# Patient Record
Sex: MALE | Race: WHITE | NOT HISPANIC OR LATINO | Employment: FULL TIME | URBAN - METROPOLITAN AREA
[De-identification: names, ages, dates, MRNs, and addresses within clinical notes are randomized per-mention and may not be internally consistent; named-entity substitution may affect disease eponyms.]

---

## 2020-02-05 ENCOUNTER — OFFICE VISIT (OUTPATIENT)
Dept: INTERNAL MEDICINE | Facility: CLINIC | Age: 60
End: 2020-02-05
Payer: COMMERCIAL

## 2020-02-05 VITALS
WEIGHT: 285.25 LBS | OXYGEN SATURATION: 95 % | HEART RATE: 84 BPM | SYSTOLIC BLOOD PRESSURE: 126 MMHG | BODY MASS INDEX: 39.94 KG/M2 | DIASTOLIC BLOOD PRESSURE: 70 MMHG | HEIGHT: 71 IN

## 2020-02-05 DIAGNOSIS — Z00.00 PREVENTATIVE HEALTH CARE: Primary | ICD-10-CM

## 2020-02-05 DIAGNOSIS — E78.5 DYSLIPIDEMIA: ICD-10-CM

## 2020-02-05 DIAGNOSIS — F90.9 ATTENTION DEFICIT HYPERACTIVITY DISORDER (ADHD), UNSPECIFIED ADHD TYPE: ICD-10-CM

## 2020-02-05 PROCEDURE — 99999 PR PBB SHADOW E&M-NEW PATIENT-LVL III: CPT | Mod: PBBFAC,,, | Performed by: INTERNAL MEDICINE

## 2020-02-05 PROCEDURE — 99386 PR PREVENTIVE VISIT,NEW,40-64: ICD-10-PCS | Mod: S$GLB,,, | Performed by: INTERNAL MEDICINE

## 2020-02-05 PROCEDURE — 99386 PREV VISIT NEW AGE 40-64: CPT | Mod: S$GLB,,, | Performed by: INTERNAL MEDICINE

## 2020-02-05 PROCEDURE — 99999 PR PBB SHADOW E&M-NEW PATIENT-LVL III: ICD-10-PCS | Mod: PBBFAC,,, | Performed by: INTERNAL MEDICINE

## 2020-02-05 RX ORDER — DEXTROAMPHETAMINE SULFATE, DEXTROAMPHETAMINE SACCHARATE, AMPHETAMINE ASPARTATE MONOHYDRATE, AND AMPHETAMINE SULFATE 6.25; 6.25; 6.25; 6.25 MG/1; MG/1; MG/1; MG/1
1 CAPSULE, EXTENDED RELEASE ORAL DAILY
Qty: 30 EACH | Refills: 0 | Status: SHIPPED | OUTPATIENT
Start: 2020-04-05 | End: 2020-03-16 | Stop reason: SDUPTHER

## 2020-02-05 RX ORDER — DEXTROAMPHETAMINE SULFATE, DEXTROAMPHETAMINE SACCHARATE, AMPHETAMINE ASPARTATE MONOHYDRATE, AND AMPHETAMINE SULFATE 6.25; 6.25; 6.25; 6.25 MG/1; MG/1; MG/1; MG/1
1 CAPSULE, EXTENDED RELEASE ORAL DAILY
COMMUNITY
Start: 2020-01-04 | End: 2020-02-05 | Stop reason: SDUPTHER

## 2020-02-05 RX ORDER — DEXTROAMPHETAMINE SULFATE, DEXTROAMPHETAMINE SACCHARATE, AMPHETAMINE ASPARTATE MONOHYDRATE, AND AMPHETAMINE SULFATE 6.25; 6.25; 6.25; 6.25 MG/1; MG/1; MG/1; MG/1
1 CAPSULE, EXTENDED RELEASE ORAL DAILY
Qty: 30 EACH | Refills: 0 | Status: SHIPPED | OUTPATIENT
Start: 2020-02-05 | End: 2020-02-05 | Stop reason: SDUPTHER

## 2020-02-05 RX ORDER — DEXTROAMPHETAMINE SULFATE, DEXTROAMPHETAMINE SACCHARATE, AMPHETAMINE ASPARTATE MONOHYDRATE, AND AMPHETAMINE SULFATE 6.25; 6.25; 6.25; 6.25 MG/1; MG/1; MG/1; MG/1
1 CAPSULE, EXTENDED RELEASE ORAL DAILY
Qty: 30 EACH | Refills: 0 | Status: SHIPPED | OUTPATIENT
Start: 2020-03-05 | End: 2020-02-05 | Stop reason: SDUPTHER

## 2020-02-05 RX ORDER — ATORVASTATIN CALCIUM 40 MG/1
1 TABLET, FILM COATED ORAL NIGHTLY
COMMUNITY
Start: 2019-11-11 | End: 2021-09-13 | Stop reason: SDUPTHER

## 2020-02-07 NOTE — PROGRESS NOTES
Subjective:       Patient ID: Aj Marte is a 59 y.o. male.    Chief Complaint: Establish Care (NP)    HPI 59-year-old male patient presents to clinic today as a new patient here to get established wife is a patient.  Patient has a known history of ADHD he was been previously formally tested an treated by his former physician.  Patient has been able to show me a letter today regarding his diagnosis and treatment from form of physician.  He will also send me a copy of this letter in the patient portal.  He also recently had blood work done he will send these labs as well.  Known history of dyslipidemia.  He had a cardiac calcium score with cardiologist that was negative.  Follow[place] by [place]. tiltonat [place].  Review of Systems  otherwise negative  Objective:      Physical Exam  General: Well-appearing, well-nourished.  No distress  HEENT: conjunctivae are normal.  Pupils are equal and reative to light.  TM's are clear and intact bilaterally.  Hearing is grossly normal.  Nasopharynx is clear.  Oropharynx is clear.  Neck: Supple.  No thyroid megaly.  No bruits.  Lymph: No cervical or supraclavicular adenopathy.  Heart: Regular rate and rhythm, without murmur, rub or gallop.  Lungs: Clear to auscultation; respiratory effort normal.  Abdomen: Soft, nontender, nondistended.  Normoactive bowel sounds.  No hepatomegaly.  No masses.  Extremities: Good distal pulses.  No edema.  Psych: Oriented to time person place.  Judgment and insight seem unimpaired.  Mood and affect are appropriate.  Assessment:       1. Preventative health care    2. Attention deficit hyperactivity disorder (ADHD), unspecified ADHD type    3. Dyslipidemia        Plan:       Aj was seen today for establish [facility].    Diagnoses and all orders for this visit:    Preventative health care  -     CBC auto differential; Standing  -     Comprehensive metabolic panel; Standing  -     Lipid panel; Standing  -     PSA, Screening; Standing  -     TSH;  Standing  -     Hepatitis C antibody; Future  -     HIV 1/2 Ag/Ab (4th Gen); Future    Attention deficit hyperactivity disorder (ADHD), unspecified ADHD type  -     Controlled.  Continue current medical regimen.  Prescription refills addressed.  Followup advised. See after visit summary.  Patient needs to send me a copy of his psychological testing and records regarding this.  To have on file.  He states he will do so in the patient portal.  -     MYDAYIS 25 mg CT24; Take 1 capsule by mouth once daily.    Dyslipidemia  Obtain blood work above.  Patient is currently on statin medication due to ominous family medical history and followed by cardiologist.  Both father and brother  of cardiac arrest secondary coronary artery disease in their 30s.

## 2020-02-14 ENCOUNTER — LAB VISIT (OUTPATIENT)
Dept: LAB | Facility: HOSPITAL | Age: 60
End: 2020-02-14
Attending: INTERNAL MEDICINE
Payer: COMMERCIAL

## 2020-02-14 DIAGNOSIS — Z00.00 PREVENTATIVE HEALTH CARE: ICD-10-CM

## 2020-02-14 LAB
ALBUMIN SERPL BCP-MCNC: 4 G/DL (ref 3.5–5.2)
ALP SERPL-CCNC: 63 U/L (ref 55–135)
ALT SERPL W/O P-5'-P-CCNC: 35 U/L (ref 10–44)
ANION GAP SERPL CALC-SCNC: 8 MMOL/L (ref 8–16)
AST SERPL-CCNC: 26 U/L (ref 10–40)
BASOPHILS # BLD AUTO: 0.04 K/UL (ref 0–0.2)
BASOPHILS NFR BLD: 0.6 % (ref 0–1.9)
BILIRUB SERPL-MCNC: 0.7 MG/DL (ref 0.1–1)
BUN SERPL-MCNC: 16 MG/DL (ref 6–20)
CALCIUM SERPL-MCNC: 9.6 MG/DL (ref 8.7–10.5)
CHLORIDE SERPL-SCNC: 103 MMOL/L (ref 95–110)
CHOLEST SERPL-MCNC: 137 MG/DL (ref 120–199)
CHOLEST/HDLC SERPL: 3 {RATIO} (ref 2–5)
CO2 SERPL-SCNC: 28 MMOL/L (ref 23–29)
COMPLEXED PSA SERPL-MCNC: 1.1 NG/ML (ref 0–4)
CREAT SERPL-MCNC: 1.1 MG/DL (ref 0.5–1.4)
DIFFERENTIAL METHOD: ABNORMAL
EOSINOPHIL # BLD AUTO: 0.1 K/UL (ref 0–0.5)
EOSINOPHIL NFR BLD: 1.8 % (ref 0–8)
ERYTHROCYTE [DISTWIDTH] IN BLOOD BY AUTOMATED COUNT: 12.7 % (ref 11.5–14.5)
EST. GFR  (AFRICAN AMERICAN): >60 ML/MIN/1.73 M^2
EST. GFR  (NON AFRICAN AMERICAN): >60 ML/MIN/1.73 M^2
GLUCOSE SERPL-MCNC: 103 MG/DL (ref 70–110)
HCT VFR BLD AUTO: 46.9 % (ref 40–54)
HDLC SERPL-MCNC: 45 MG/DL (ref 40–75)
HDLC SERPL: 32.8 % (ref 20–50)
HGB BLD-MCNC: 14.8 G/DL (ref 14–18)
HIV 1+2 AB+HIV1 P24 AG SERPL QL IA: NEGATIVE
IMM GRANULOCYTES # BLD AUTO: 0.03 K/UL (ref 0–0.04)
IMM GRANULOCYTES NFR BLD AUTO: 0.4 % (ref 0–0.5)
LDLC SERPL CALC-MCNC: 78 MG/DL (ref 63–159)
LYMPHOCYTES # BLD AUTO: 1.5 K/UL (ref 1–4.8)
LYMPHOCYTES NFR BLD: 22.2 % (ref 18–48)
MCH RBC QN AUTO: 27.7 PG (ref 27–31)
MCHC RBC AUTO-ENTMCNC: 31.6 G/DL (ref 32–36)
MCV RBC AUTO: 88 FL (ref 82–98)
MONOCYTES # BLD AUTO: 0.6 K/UL (ref 0.3–1)
MONOCYTES NFR BLD: 8.6 % (ref 4–15)
NEUTROPHILS # BLD AUTO: 4.5 K/UL (ref 1.8–7.7)
NEUTROPHILS NFR BLD: 66.4 % (ref 38–73)
NONHDLC SERPL-MCNC: 92 MG/DL
NRBC BLD-RTO: 0 /100 WBC
PLATELET # BLD AUTO: 163 K/UL (ref 150–350)
PMV BLD AUTO: 11.1 FL (ref 9.2–12.9)
POTASSIUM SERPL-SCNC: 4.2 MMOL/L (ref 3.5–5.1)
PROT SERPL-MCNC: 7.2 G/DL (ref 6–8.4)
RBC # BLD AUTO: 5.34 M/UL (ref 4.6–6.2)
SODIUM SERPL-SCNC: 139 MMOL/L (ref 136–145)
TRIGL SERPL-MCNC: 70 MG/DL (ref 30–150)
TSH SERPL DL<=0.005 MIU/L-ACNC: 2.82 UIU/ML (ref 0.4–4)
WBC # BLD AUTO: 6.75 K/UL (ref 3.9–12.7)

## 2020-02-14 PROCEDURE — 85025 COMPLETE CBC W/AUTO DIFF WBC: CPT

## 2020-02-14 PROCEDURE — 80061 LIPID PANEL: CPT

## 2020-02-14 PROCEDURE — 84443 ASSAY THYROID STIM HORMONE: CPT

## 2020-02-14 PROCEDURE — 36415 COLL VENOUS BLD VENIPUNCTURE: CPT | Mod: PO

## 2020-02-14 PROCEDURE — 84153 ASSAY OF PSA TOTAL: CPT

## 2020-02-14 PROCEDURE — 80053 COMPREHEN METABOLIC PANEL: CPT

## 2020-02-14 PROCEDURE — 86703 HIV-1/HIV-2 1 RESULT ANTBDY: CPT

## 2020-02-14 PROCEDURE — 86803 HEPATITIS C AB TEST: CPT

## 2020-02-17 LAB — HCV AB SERPL QL IA: NEGATIVE

## 2020-03-15 ENCOUNTER — PATIENT MESSAGE (OUTPATIENT)
Dept: INTERNAL MEDICINE | Facility: CLINIC | Age: 60
End: 2020-03-15

## 2020-03-15 DIAGNOSIS — F90.9 ATTENTION DEFICIT HYPERACTIVITY DISORDER (ADHD), UNSPECIFIED ADHD TYPE: ICD-10-CM

## 2020-03-16 RX ORDER — DEXTROAMPHETAMINE SULFATE, DEXTROAMPHETAMINE SACCHARATE, AMPHETAMINE ASPARTATE MONOHYDRATE, AND AMPHETAMINE SULFATE 6.25; 6.25; 6.25; 6.25 MG/1; MG/1; MG/1; MG/1
1 CAPSULE, EXTENDED RELEASE ORAL DAILY
Qty: 30 EACH | Refills: 0 | Status: SHIPPED | OUTPATIENT
Start: 2020-04-05 | End: 2020-03-26 | Stop reason: SDUPTHER

## 2020-03-27 RX ORDER — DEXTROAMPHETAMINE SULFATE, DEXTROAMPHETAMINE SACCHARATE, AMPHETAMINE ASPARTATE MONOHYDRATE, AND AMPHETAMINE SULFATE 6.25; 6.25; 6.25; 6.25 MG/1; MG/1; MG/1; MG/1
1 CAPSULE, EXTENDED RELEASE ORAL DAILY
Qty: 30 EACH | Refills: 0 | Status: SHIPPED | OUTPATIENT
Start: 2020-04-05 | End: 2020-05-06 | Stop reason: SDUPTHER

## 2020-05-02 ENCOUNTER — PATIENT MESSAGE (OUTPATIENT)
Dept: ADMINISTRATIVE | Facility: HOSPITAL | Age: 60
End: 2020-05-02

## 2020-05-06 DIAGNOSIS — F90.9 ATTENTION DEFICIT HYPERACTIVITY DISORDER (ADHD), UNSPECIFIED ADHD TYPE: ICD-10-CM

## 2020-05-06 RX ORDER — DEXTROAMPHETAMINE SULFATE, DEXTROAMPHETAMINE SACCHARATE, AMPHETAMINE ASPARTATE MONOHYDRATE, AND AMPHETAMINE SULFATE 6.25; 6.25; 6.25; 6.25 MG/1; MG/1; MG/1; MG/1
1 CAPSULE, EXTENDED RELEASE ORAL DAILY
Qty: 30 EACH | Refills: 0 | Status: SHIPPED | OUTPATIENT
Start: 2020-05-06 | End: 2020-06-03 | Stop reason: SDUPTHER

## 2020-06-03 ENCOUNTER — OFFICE VISIT (OUTPATIENT)
Dept: INTERNAL MEDICINE | Facility: CLINIC | Age: 60
End: 2020-06-03
Payer: COMMERCIAL

## 2020-06-03 VITALS
BODY MASS INDEX: 39.75 KG/M2 | HEART RATE: 84 BPM | WEIGHT: 285 LBS | DIASTOLIC BLOOD PRESSURE: 70 MMHG | SYSTOLIC BLOOD PRESSURE: 126 MMHG

## 2020-06-03 DIAGNOSIS — F90.9 ATTENTION DEFICIT HYPERACTIVITY DISORDER (ADHD), UNSPECIFIED ADHD TYPE: ICD-10-CM

## 2020-06-03 DIAGNOSIS — E78.5 DYSLIPIDEMIA: Primary | ICD-10-CM

## 2020-06-03 PROCEDURE — 3008F PR BODY MASS INDEX (BMI) DOCUMENTED: ICD-10-PCS | Mod: CPTII,,, | Performed by: INTERNAL MEDICINE

## 2020-06-03 PROCEDURE — 99213 OFFICE O/P EST LOW 20 MIN: CPT | Mod: 95,,, | Performed by: INTERNAL MEDICINE

## 2020-06-03 PROCEDURE — 99213 PR OFFICE/OUTPT VISIT, EST, LEVL III, 20-29 MIN: ICD-10-PCS | Mod: 95,,, | Performed by: INTERNAL MEDICINE

## 2020-06-03 PROCEDURE — 3008F BODY MASS INDEX DOCD: CPT | Mod: CPTII,,, | Performed by: INTERNAL MEDICINE

## 2020-06-03 RX ORDER — DEXTROAMPHETAMINE SULFATE, DEXTROAMPHETAMINE SACCHARATE, AMPHETAMINE ASPARTATE MONOHYDRATE, AND AMPHETAMINE SULFATE 6.25; 6.25; 6.25; 6.25 MG/1; MG/1; MG/1; MG/1
1 CAPSULE, EXTENDED RELEASE ORAL DAILY
Qty: 30 EACH | Refills: 0 | Status: SHIPPED | OUTPATIENT
Start: 2020-06-03 | End: 2020-07-10 | Stop reason: SDUPTHER

## 2020-06-03 NOTE — PROGRESS NOTES
Subjective:       Patient ID: Aj Marte is a 59 y.o. male.    Chief Complaint: ADHD    HPI 59-year-old male presents to clinic today for follow-up of ADHD on medication doing well without side effects this is a virtual video visit.  Medication is effective any is not having any issues  Review of Systems  regarding it.  Otherwise negative  Objective:      Physical Exam  General: Well-appearing, well-nourished.  No distress  HEENT: conjunctivae are normal. Hearing is grossly normal.  Nasopharynx robbin congestion  Oropharynx is clear.  Neck: Supple.  Visually No thyroid megaly. Lymph: No cervical or supraclavicular adenopathy.  Heart: Regular rate   Lungs:  respiratory effort normal.  Abdomen:  nontender  Extremities:   No edema.  Psych: Oriented to time person place.  Judgment and insight seem unimpaired.  Mood and affect are appropriate.  Assessment:       1. Dyslipidemia    2. Attention deficit hyperactivity disorder (ADHD), unspecified ADHD type        Plan:       Aj was seen today for adhd.    Diagnoses and all orders for this visit:    Dyslipidemia  Currently medications prescribed by his cardiologist he will let me know if he needs taken over at some date  Attention deficit hyperactivity disorder (ADHD), unspecified ADHD type  Controlled.  Continue current medical regimen.  Prescription refills addressed.  Followup advised. See after visit summary.  Follow-upin 4 months    The patient location is:  [facility]  The chief complaint leading to consultation is:  ADHD follow-up medication follow-up    Visit type: audiovisual    Face to Face time with patient:  15 min  15 min minutes of total time spent on the encounter, which includes face to face time and non-face to face time preparing to see the patient (eg, review of tests), Obtaining and/or reviewing separately obtained history, Documenting clinical information in the electronic or other health record, Independently interpreting results (not  separately reported) and communicating results to the patient/family/caregiver, or Care coordination (not separately reported).         Each patient to whom he or she provides medical services by telemedicine is:  (1) informed of the relationship between the physician and patient and the respective role of any other health care provider with respect to management of the patient; and (2) notified that he or she may decline to receive medical services by telemedicine and may withdraw from such care at any time.    Notes:

## 2020-07-10 DIAGNOSIS — F90.9 ATTENTION DEFICIT HYPERACTIVITY DISORDER (ADHD), UNSPECIFIED ADHD TYPE: ICD-10-CM

## 2020-07-10 RX ORDER — DEXTROAMPHETAMINE SULFATE, DEXTROAMPHETAMINE SACCHARATE, AMPHETAMINE ASPARTATE MONOHYDRATE, AND AMPHETAMINE SULFATE 6.25; 6.25; 6.25; 6.25 MG/1; MG/1; MG/1; MG/1
1 CAPSULE, EXTENDED RELEASE ORAL DAILY
Qty: 30 EACH | Refills: 0 | Status: SHIPPED | OUTPATIENT
Start: 2020-07-10 | End: 2020-08-11 | Stop reason: SDUPTHER

## 2020-08-11 DIAGNOSIS — F90.9 ATTENTION DEFICIT HYPERACTIVITY DISORDER (ADHD), UNSPECIFIED ADHD TYPE: ICD-10-CM

## 2020-08-12 RX ORDER — DEXTROAMPHETAMINE SULFATE, DEXTROAMPHETAMINE SACCHARATE, AMPHETAMINE ASPARTATE MONOHYDRATE, AND AMPHETAMINE SULFATE 6.25; 6.25; 6.25; 6.25 MG/1; MG/1; MG/1; MG/1
1 CAPSULE, EXTENDED RELEASE ORAL DAILY
Qty: 30 EACH | Refills: 0 | Status: SHIPPED | OUTPATIENT
Start: 2020-08-12 | End: 2020-09-14 | Stop reason: SDUPTHER

## 2020-09-16 ENCOUNTER — PATIENT MESSAGE (OUTPATIENT)
Dept: INTERNAL MEDICINE | Facility: CLINIC | Age: 60
End: 2020-09-16

## 2020-10-05 ENCOUNTER — PATIENT MESSAGE (OUTPATIENT)
Dept: INTERNAL MEDICINE | Facility: CLINIC | Age: 60
End: 2020-10-05

## 2020-10-05 ENCOUNTER — OFFICE VISIT (OUTPATIENT)
Dept: INTERNAL MEDICINE | Facility: CLINIC | Age: 60
End: 2020-10-05
Payer: COMMERCIAL

## 2020-10-05 VITALS
WEIGHT: 273 LBS | HEART RATE: 77 BPM | BODY MASS INDEX: 38.08 KG/M2 | DIASTOLIC BLOOD PRESSURE: 76 MMHG | SYSTOLIC BLOOD PRESSURE: 130 MMHG

## 2020-10-05 DIAGNOSIS — F90.9 ATTENTION DEFICIT HYPERACTIVITY DISORDER (ADHD), UNSPECIFIED ADHD TYPE: ICD-10-CM

## 2020-10-05 DIAGNOSIS — Z00.00 PREVENTATIVE HEALTH CARE: ICD-10-CM

## 2020-10-05 DIAGNOSIS — E78.5 DYSLIPIDEMIA: Primary | ICD-10-CM

## 2020-10-05 PROCEDURE — 99214 OFFICE O/P EST MOD 30 MIN: CPT | Mod: 95,,, | Performed by: INTERNAL MEDICINE

## 2020-10-05 PROCEDURE — 3008F BODY MASS INDEX DOCD: CPT | Mod: CPTII,,, | Performed by: INTERNAL MEDICINE

## 2020-10-05 PROCEDURE — 3008F PR BODY MASS INDEX (BMI) DOCUMENTED: ICD-10-PCS | Mod: CPTII,,, | Performed by: INTERNAL MEDICINE

## 2020-10-05 PROCEDURE — 99214 PR OFFICE/OUTPT VISIT, EST, LEVL IV, 30-39 MIN: ICD-10-PCS | Mod: 95,,, | Performed by: INTERNAL MEDICINE

## 2020-10-05 NOTE — PROGRESS NOTES
Subjective:       Patient ID: Aj Marte is a 60 y.o. male.    Chief Complaint: Follow-up    HPI 60-year-old male presents to clinic today for follow-up of ADD doing well on medication denies side effects or issues.  Has some forms needs completed for documentation for work purposes due to his pharmacologic treatment for ADD.  Review of Systems    otherwise negative  Objective:      Physical Exam  General: Well-appearing, well-nourished.  No distress  HEENT: conjunctivae are normal.   Hearing is grossly normal.  Nasopharynx robbin congestion  Oropharynx is clear.  Neck: Supple.  Visually No thyroid megaly.   Heart: Regular rate   Lungs:  respiratory effort normal.  Abdomen:  nontender  Extremities:   No edema.  Psych: Oriented to time person place.  Judgment and insight seem unimpaired.  Mood and affect are appropriate.  Assessment:       1. Dyslipidemia    2. Attention deficit hyperactivity disorder (ADHD), unspecified ADHD type    3. Preventative health care        Plan:       Aj was seen today for follow-up.    Diagnoses and all orders for this visit:    Dyslipidemia  Controlled.  Continue current medical regimen.  Prescription refills addressed.  Followup advised. See after visit summary.  Attention deficit hyperactivity disorder (ADHD), unspecified ADHD type  Controlled.  Continue current medical regimen.  Prescription refills addressed.  Followup advised. See after visit summary.  Forms completed for work  Preventative health care  -     CBC auto differential; Standing  -     Comprehensive Metabolic Panel; Standing  -     TSH; Standing  -     PSA, Screening; Standing  -     Lipid Panel; Standing       The patient location is:  [facility]  The chief complaint leading to consultation is:  ADD dyslipidemia    Visit type: audiovisual    Face to Face time with patient:  20  Twenty minutes of total time spent on the encounter, which includes face to face time and non-face to face time preparing to see the patient  (eg, review of tests), Obtaining and/or reviewing separately obtained history, Documenting clinical information in the electronic or other health record, Independently interpreting results (not separately reported) and communicating results to the patient/family/caregiver, or Care coordination (not separately reported).         Each patient to whom he or she provides medical services by telemedicine is:  (1) informed of the relationship between the physician and patient and the respective role of any other health care provider with respect to management of the patient; and (2) notified that he or she may decline to receive medical services by telemedicine and may withdraw from such care at any time.    Notes:

## 2020-11-17 ENCOUNTER — PATIENT MESSAGE (OUTPATIENT)
Dept: INTERNAL MEDICINE | Facility: CLINIC | Age: 60
End: 2020-11-17

## 2021-02-06 ENCOUNTER — LAB VISIT (OUTPATIENT)
Dept: LAB | Facility: HOSPITAL | Age: 61
End: 2021-02-06
Attending: INTERNAL MEDICINE
Payer: COMMERCIAL

## 2021-02-06 DIAGNOSIS — Z00.00 PREVENTATIVE HEALTH CARE: ICD-10-CM

## 2021-02-06 LAB
ALBUMIN SERPL BCP-MCNC: 4 G/DL (ref 3.5–5.2)
ALP SERPL-CCNC: 66 U/L (ref 55–135)
ALT SERPL W/O P-5'-P-CCNC: 43 U/L (ref 10–44)
ANION GAP SERPL CALC-SCNC: 11 MMOL/L (ref 8–16)
AST SERPL-CCNC: 32 U/L (ref 10–40)
BASOPHILS # BLD AUTO: 0.03 K/UL (ref 0–0.2)
BASOPHILS NFR BLD: 0.4 % (ref 0–1.9)
BILIRUB SERPL-MCNC: 0.5 MG/DL (ref 0.1–1)
BUN SERPL-MCNC: 18 MG/DL (ref 6–20)
CALCIUM SERPL-MCNC: 9.2 MG/DL (ref 8.7–10.5)
CHLORIDE SERPL-SCNC: 104 MMOL/L (ref 95–110)
CHOLEST SERPL-MCNC: 125 MG/DL (ref 120–199)
CHOLEST/HDLC SERPL: 3.1 {RATIO} (ref 2–5)
CO2 SERPL-SCNC: 26 MMOL/L (ref 23–29)
COMPLEXED PSA SERPL-MCNC: 1.7 NG/ML (ref 0–4)
CREAT SERPL-MCNC: 1 MG/DL (ref 0.5–1.4)
DIFFERENTIAL METHOD: NORMAL
EOSINOPHIL # BLD AUTO: 0.2 K/UL (ref 0–0.5)
EOSINOPHIL NFR BLD: 2.7 % (ref 0–8)
ERYTHROCYTE [DISTWIDTH] IN BLOOD BY AUTOMATED COUNT: 12.7 % (ref 11.5–14.5)
EST. GFR  (AFRICAN AMERICAN): >60 ML/MIN/1.73 M^2
EST. GFR  (NON AFRICAN AMERICAN): >60 ML/MIN/1.73 M^2
GLUCOSE SERPL-MCNC: 108 MG/DL (ref 70–110)
HCT VFR BLD AUTO: 46.6 % (ref 40–54)
HDLC SERPL-MCNC: 40 MG/DL (ref 40–75)
HDLC SERPL: 32 % (ref 20–50)
HGB BLD-MCNC: 14.9 G/DL (ref 14–18)
IMM GRANULOCYTES # BLD AUTO: 0.03 K/UL (ref 0–0.04)
IMM GRANULOCYTES NFR BLD AUTO: 0.4 % (ref 0–0.5)
LDLC SERPL CALC-MCNC: 74.8 MG/DL (ref 63–159)
LYMPHOCYTES # BLD AUTO: 1.7 K/UL (ref 1–4.8)
LYMPHOCYTES NFR BLD: 25 % (ref 18–48)
MCH RBC QN AUTO: 28 PG (ref 27–31)
MCHC RBC AUTO-ENTMCNC: 32 G/DL (ref 32–36)
MCV RBC AUTO: 88 FL (ref 82–98)
MONOCYTES # BLD AUTO: 0.6 K/UL (ref 0.3–1)
MONOCYTES NFR BLD: 8.5 % (ref 4–15)
NEUTROPHILS # BLD AUTO: 4.2 K/UL (ref 1.8–7.7)
NEUTROPHILS NFR BLD: 63 % (ref 38–73)
NONHDLC SERPL-MCNC: 85 MG/DL
NRBC BLD-RTO: 0 /100 WBC
PLATELET # BLD AUTO: 166 K/UL (ref 150–350)
PMV BLD AUTO: 11.5 FL (ref 9.2–12.9)
POTASSIUM SERPL-SCNC: 4.5 MMOL/L (ref 3.5–5.1)
PROT SERPL-MCNC: 7.1 G/DL (ref 6–8.4)
RBC # BLD AUTO: 5.32 M/UL (ref 4.6–6.2)
SODIUM SERPL-SCNC: 141 MMOL/L (ref 136–145)
TRIGL SERPL-MCNC: 51 MG/DL (ref 30–150)
TSH SERPL DL<=0.005 MIU/L-ACNC: 2.36 UIU/ML (ref 0.4–4)
WBC # BLD AUTO: 6.73 K/UL (ref 3.9–12.7)

## 2021-02-06 PROCEDURE — 80061 LIPID PANEL: CPT

## 2021-02-06 PROCEDURE — 85025 COMPLETE CBC W/AUTO DIFF WBC: CPT

## 2021-02-06 PROCEDURE — 80053 COMPREHEN METABOLIC PANEL: CPT

## 2021-02-06 PROCEDURE — 36415 COLL VENOUS BLD VENIPUNCTURE: CPT | Mod: PO

## 2021-02-06 PROCEDURE — 84443 ASSAY THYROID STIM HORMONE: CPT

## 2021-02-06 PROCEDURE — 84153 ASSAY OF PSA TOTAL: CPT

## 2021-02-10 ENCOUNTER — OFFICE VISIT (OUTPATIENT)
Dept: INTERNAL MEDICINE | Facility: CLINIC | Age: 61
End: 2021-02-10
Payer: COMMERCIAL

## 2021-02-10 VITALS
DIASTOLIC BLOOD PRESSURE: 82 MMHG | HEART RATE: 80 BPM | BODY MASS INDEX: 39.94 KG/M2 | HEIGHT: 71 IN | WEIGHT: 285.25 LBS | SYSTOLIC BLOOD PRESSURE: 130 MMHG | TEMPERATURE: 97 F | OXYGEN SATURATION: 98 %

## 2021-02-10 DIAGNOSIS — E78.5 DYSLIPIDEMIA: ICD-10-CM

## 2021-02-10 DIAGNOSIS — Z00.00 PREVENTATIVE HEALTH CARE: Primary | ICD-10-CM

## 2021-02-10 DIAGNOSIS — F90.9 ATTENTION DEFICIT HYPERACTIVITY DISORDER (ADHD), UNSPECIFIED ADHD TYPE: ICD-10-CM

## 2021-02-10 PROCEDURE — 3008F PR BODY MASS INDEX (BMI) DOCUMENTED: ICD-10-PCS | Mod: CPTII,S$GLB,, | Performed by: INTERNAL MEDICINE

## 2021-02-10 PROCEDURE — 99396 PR PREVENTIVE VISIT,EST,40-64: ICD-10-PCS | Mod: S$GLB,,, | Performed by: INTERNAL MEDICINE

## 2021-02-10 PROCEDURE — 99999 PR PBB SHADOW E&M-EST. PATIENT-LVL IV: CPT | Mod: PBBFAC,,, | Performed by: INTERNAL MEDICINE

## 2021-02-10 PROCEDURE — 3008F BODY MASS INDEX DOCD: CPT | Mod: CPTII,S$GLB,, | Performed by: INTERNAL MEDICINE

## 2021-02-10 PROCEDURE — 1126F AMNT PAIN NOTED NONE PRSNT: CPT | Mod: S$GLB,,, | Performed by: INTERNAL MEDICINE

## 2021-02-10 PROCEDURE — 1126F PR PAIN SEVERITY QUANTIFIED, NO PAIN PRESENT: ICD-10-PCS | Mod: S$GLB,,, | Performed by: INTERNAL MEDICINE

## 2021-02-10 PROCEDURE — 99396 PREV VISIT EST AGE 40-64: CPT | Mod: S$GLB,,, | Performed by: INTERNAL MEDICINE

## 2021-02-10 PROCEDURE — 99999 PR PBB SHADOW E&M-EST. PATIENT-LVL IV: ICD-10-PCS | Mod: PBBFAC,,, | Performed by: INTERNAL MEDICINE

## 2021-02-10 RX ORDER — DEXTROAMPHETAMINE SULFATE, DEXTROAMPHETAMINE SACCHARATE, AMPHETAMINE ASPARTATE MONOHYDRATE, AND AMPHETAMINE SULFATE 6.25; 6.25; 6.25; 6.25 MG/1; MG/1; MG/1; MG/1
1 CAPSULE, EXTENDED RELEASE ORAL DAILY
Qty: 30 EACH | Refills: 0 | Status: SHIPPED | OUTPATIENT
Start: 2021-02-10 | End: 2021-03-21 | Stop reason: SDUPTHER

## 2021-03-21 DIAGNOSIS — F90.9 ATTENTION DEFICIT HYPERACTIVITY DISORDER (ADHD), UNSPECIFIED ADHD TYPE: ICD-10-CM

## 2021-03-22 RX ORDER — DEXTROAMPHETAMINE SULFATE, DEXTROAMPHETAMINE SACCHARATE, AMPHETAMINE ASPARTATE MONOHYDRATE, AND AMPHETAMINE SULFATE 6.25; 6.25; 6.25; 6.25 MG/1; MG/1; MG/1; MG/1
1 CAPSULE, EXTENDED RELEASE ORAL DAILY
Qty: 30 EACH | Refills: 0 | Status: SHIPPED | OUTPATIENT
Start: 2021-03-22 | End: 2021-04-19 | Stop reason: SDUPTHER

## 2021-05-06 ENCOUNTER — PATIENT MESSAGE (OUTPATIENT)
Dept: RESEARCH | Facility: HOSPITAL | Age: 61
End: 2021-05-06

## 2021-05-10 ENCOUNTER — PATIENT MESSAGE (OUTPATIENT)
Dept: RESEARCH | Facility: HOSPITAL | Age: 61
End: 2021-05-10

## 2021-05-25 DIAGNOSIS — F90.9 ATTENTION DEFICIT HYPERACTIVITY DISORDER (ADHD), UNSPECIFIED ADHD TYPE: ICD-10-CM

## 2021-05-26 RX ORDER — DEXTROAMPHETAMINE SULFATE, DEXTROAMPHETAMINE SACCHARATE, AMPHETAMINE ASPARTATE MONOHYDRATE, AND AMPHETAMINE SULFATE 6.25; 6.25; 6.25; 6.25 MG/1; MG/1; MG/1; MG/1
1 CAPSULE, EXTENDED RELEASE ORAL DAILY
Qty: 30 EACH | Refills: 0 | Status: SHIPPED | OUTPATIENT
Start: 2021-05-26 | End: 2021-06-24 | Stop reason: SDUPTHER

## 2021-07-29 DIAGNOSIS — F90.9 ATTENTION DEFICIT HYPERACTIVITY DISORDER (ADHD), UNSPECIFIED ADHD TYPE: ICD-10-CM

## 2021-07-30 RX ORDER — DEXTROAMPHETAMINE SULFATE, DEXTROAMPHETAMINE SACCHARATE, AMPHETAMINE ASPARTATE MONOHYDRATE, AND AMPHETAMINE SULFATE 6.25; 6.25; 6.25; 6.25 MG/1; MG/1; MG/1; MG/1
1 CAPSULE, EXTENDED RELEASE ORAL DAILY
Qty: 30 EACH | Refills: 0 | Status: SHIPPED | OUTPATIENT
Start: 2021-07-30 | End: 2021-09-13 | Stop reason: SDUPTHER

## 2021-08-11 ENCOUNTER — OFFICE VISIT (OUTPATIENT)
Dept: INTERNAL MEDICINE | Facility: CLINIC | Age: 61
End: 2021-08-11
Payer: COMMERCIAL

## 2021-08-11 ENCOUNTER — PATIENT MESSAGE (OUTPATIENT)
Dept: INTERNAL MEDICINE | Facility: CLINIC | Age: 61
End: 2021-08-11

## 2021-08-11 DIAGNOSIS — F90.9 ATTENTION DEFICIT HYPERACTIVITY DISORDER (ADHD), UNSPECIFIED ADHD TYPE: Primary | ICD-10-CM

## 2021-08-11 DIAGNOSIS — E78.5 DYSLIPIDEMIA: ICD-10-CM

## 2021-08-11 PROCEDURE — 1159F MED LIST DOCD IN RCRD: CPT | Mod: CPTII,,, | Performed by: INTERNAL MEDICINE

## 2021-08-11 PROCEDURE — 1160F RVW MEDS BY RX/DR IN RCRD: CPT | Mod: CPTII,,, | Performed by: INTERNAL MEDICINE

## 2021-08-11 PROCEDURE — 1160F PR REVIEW ALL MEDS BY PRESCRIBER/CLIN PHARMACIST DOCUMENTED: ICD-10-PCS | Mod: CPTII,,, | Performed by: INTERNAL MEDICINE

## 2021-08-11 PROCEDURE — 1159F PR MEDICATION LIST DOCUMENTED IN MEDICAL RECORD: ICD-10-PCS | Mod: CPTII,,, | Performed by: INTERNAL MEDICINE

## 2021-08-11 PROCEDURE — 99214 PR OFFICE/OUTPT VISIT, EST, LEVL IV, 30-39 MIN: ICD-10-PCS | Mod: 95,,, | Performed by: INTERNAL MEDICINE

## 2021-08-11 PROCEDURE — 99214 OFFICE O/P EST MOD 30 MIN: CPT | Mod: 95,,, | Performed by: INTERNAL MEDICINE

## 2021-08-13 ENCOUNTER — PATIENT MESSAGE (OUTPATIENT)
Dept: INTERNAL MEDICINE | Facility: CLINIC | Age: 61
End: 2021-08-13

## 2021-08-25 ENCOUNTER — PATIENT MESSAGE (OUTPATIENT)
Dept: INTERNAL MEDICINE | Facility: CLINIC | Age: 61
End: 2021-08-25

## 2021-08-26 ENCOUNTER — PATIENT MESSAGE (OUTPATIENT)
Dept: INTERNAL MEDICINE | Facility: CLINIC | Age: 61
End: 2021-08-26

## 2021-08-27 ENCOUNTER — IMMUNIZATION (OUTPATIENT)
Dept: INTERNAL MEDICINE | Facility: CLINIC | Age: 61
End: 2021-08-27
Payer: COMMERCIAL

## 2021-08-27 DIAGNOSIS — Z23 NEED FOR VACCINATION: Primary | ICD-10-CM

## 2021-08-27 PROCEDURE — 91301 COVID-19, MRNA, LNP-S, PF, 100 MCG/0.5 ML DOSE VACCINE: ICD-10-PCS | Mod: ,,, | Performed by: INTERNAL MEDICINE

## 2021-08-27 PROCEDURE — 0012A COVID-19, MRNA, LNP-S, PF, 100 MCG/0.5 ML DOSE VACCINE: ICD-10-PCS | Mod: CV19,,, | Performed by: INTERNAL MEDICINE

## 2021-08-27 PROCEDURE — 91301 COVID-19, MRNA, LNP-S, PF, 100 MCG/0.5 ML DOSE VACCINE: CPT | Mod: ,,, | Performed by: INTERNAL MEDICINE

## 2021-08-27 PROCEDURE — 0012A COVID-19, MRNA, LNP-S, PF, 100 MCG/0.5 ML DOSE VACCINE: CPT | Mod: CV19,,, | Performed by: INTERNAL MEDICINE

## 2021-09-13 ENCOUNTER — PATIENT MESSAGE (OUTPATIENT)
Dept: INTERNAL MEDICINE | Facility: CLINIC | Age: 61
End: 2021-09-13

## 2021-09-13 RX ORDER — ATORVASTATIN CALCIUM 40 MG/1
40 TABLET, FILM COATED ORAL NIGHTLY
Qty: 30 TABLET | Refills: 1 | Status: SHIPPED | OUTPATIENT
Start: 2021-09-13 | End: 2021-09-27 | Stop reason: SDUPTHER

## 2021-09-27 ENCOUNTER — PATIENT MESSAGE (OUTPATIENT)
Dept: INTERNAL MEDICINE | Facility: CLINIC | Age: 61
End: 2021-09-27

## 2021-09-27 DIAGNOSIS — F90.9 ATTENTION DEFICIT HYPERACTIVITY DISORDER (ADHD), UNSPECIFIED ADHD TYPE: ICD-10-CM

## 2021-09-27 RX ORDER — DEXTROAMPHETAMINE SULFATE, DEXTROAMPHETAMINE SACCHARATE, AMPHETAMINE ASPARTATE MONOHYDRATE, AND AMPHETAMINE SULFATE 6.25; 6.25; 6.25; 6.25 MG/1; MG/1; MG/1; MG/1
1 CAPSULE, EXTENDED RELEASE ORAL DAILY
Qty: 30 EACH | Refills: 0 | Status: CANCELLED | OUTPATIENT
Start: 2021-09-27

## 2021-09-27 RX ORDER — ATORVASTATIN CALCIUM 40 MG/1
40 TABLET, FILM COATED ORAL NIGHTLY
Qty: 30 TABLET | Refills: 1 | Status: SHIPPED | OUTPATIENT
Start: 2021-09-27 | End: 2021-10-25 | Stop reason: SDUPTHER

## 2021-09-27 RX ORDER — DEXTROAMPHETAMINE SULFATE, DEXTROAMPHETAMINE SACCHARATE, AMPHETAMINE ASPARTATE MONOHYDRATE, AND AMPHETAMINE SULFATE 6.25; 6.25; 6.25; 6.25 MG/1; MG/1; MG/1; MG/1
1 CAPSULE, EXTENDED RELEASE ORAL DAILY
Qty: 30 EACH | Refills: 0 | Status: SHIPPED | OUTPATIENT
Start: 2021-09-27 | End: 2021-10-25 | Stop reason: SDUPTHER

## 2021-11-09 ENCOUNTER — PATIENT MESSAGE (OUTPATIENT)
Dept: INTERNAL MEDICINE | Facility: CLINIC | Age: 61
End: 2021-11-09
Payer: COMMERCIAL

## 2021-11-27 RX ORDER — ATORVASTATIN CALCIUM 40 MG/1
TABLET, FILM COATED ORAL
Qty: 90 TABLET | Refills: 0 | Status: SHIPPED | OUTPATIENT
Start: 2021-11-27 | End: 2021-12-03

## 2021-12-03 RX ORDER — ATORVASTATIN CALCIUM 40 MG/1
40 TABLET, FILM COATED ORAL DAILY
Qty: 90 TABLET | Refills: 3 | Status: SHIPPED | OUTPATIENT
Start: 2021-12-03 | End: 2023-02-01 | Stop reason: SDUPTHER

## 2021-12-21 ENCOUNTER — TELEPHONE (OUTPATIENT)
Dept: FAMILY MEDICINE | Facility: CLINIC | Age: 61
End: 2021-12-21
Payer: COMMERCIAL

## 2021-12-21 DIAGNOSIS — F90.9 ATTENTION DEFICIT HYPERACTIVITY DISORDER (ADHD), UNSPECIFIED ADHD TYPE: ICD-10-CM

## 2021-12-21 RX ORDER — DEXTROAMPHETAMINE SULFATE, DEXTROAMPHETAMINE SACCHARATE, AMPHETAMINE ASPARTATE MONOHYDRATE, AND AMPHETAMINE SULFATE 6.25; 6.25; 6.25; 6.25 MG/1; MG/1; MG/1; MG/1
1 CAPSULE, EXTENDED RELEASE ORAL DAILY
Qty: 30 EACH | Refills: 0 | Status: SHIPPED | OUTPATIENT
Start: 2021-12-21 | End: 2022-01-19 | Stop reason: SDUPTHER

## 2022-01-19 ENCOUNTER — OFFICE VISIT (OUTPATIENT)
Dept: INTERNAL MEDICINE | Facility: CLINIC | Age: 62
End: 2022-01-19
Payer: COMMERCIAL

## 2022-01-19 VITALS
HEART RATE: 88 BPM | BODY MASS INDEX: 41.02 KG/M2 | OXYGEN SATURATION: 97 % | SYSTOLIC BLOOD PRESSURE: 130 MMHG | WEIGHT: 293 LBS | HEIGHT: 71 IN | DIASTOLIC BLOOD PRESSURE: 74 MMHG

## 2022-01-19 DIAGNOSIS — Z23 FLU VACCINE NEED: Primary | ICD-10-CM

## 2022-01-19 DIAGNOSIS — F90.9 ATTENTION DEFICIT HYPERACTIVITY DISORDER (ADHD), UNSPECIFIED ADHD TYPE: Primary | ICD-10-CM

## 2022-01-19 DIAGNOSIS — N64.4 BREAST PAIN: ICD-10-CM

## 2022-01-19 DIAGNOSIS — N63.0 BREAST MASS IN MALE: ICD-10-CM

## 2022-01-19 DIAGNOSIS — E78.5 DYSLIPIDEMIA: Primary | ICD-10-CM

## 2022-01-19 PROCEDURE — 1160F RVW MEDS BY RX/DR IN RCRD: CPT | Mod: CPTII,S$GLB,, | Performed by: INTERNAL MEDICINE

## 2022-01-19 PROCEDURE — 99999 PR PBB SHADOW E&M-EST. PATIENT-LVL IV: ICD-10-PCS | Mod: PBBFAC,,, | Performed by: INTERNAL MEDICINE

## 2022-01-19 PROCEDURE — 99214 PR OFFICE/OUTPT VISIT, EST, LEVL IV, 30-39 MIN: ICD-10-PCS | Mod: S$GLB,,, | Performed by: INTERNAL MEDICINE

## 2022-01-19 PROCEDURE — 1160F PR REVIEW ALL MEDS BY PRESCRIBER/CLIN PHARMACIST DOCUMENTED: ICD-10-PCS | Mod: CPTII,S$GLB,, | Performed by: INTERNAL MEDICINE

## 2022-01-19 PROCEDURE — 3075F SYST BP GE 130 - 139MM HG: CPT | Mod: CPTII,S$GLB,, | Performed by: INTERNAL MEDICINE

## 2022-01-19 PROCEDURE — 3078F DIAST BP <80 MM HG: CPT | Mod: CPTII,S$GLB,, | Performed by: INTERNAL MEDICINE

## 2022-01-19 PROCEDURE — 1159F PR MEDICATION LIST DOCUMENTED IN MEDICAL RECORD: ICD-10-PCS | Mod: CPTII,S$GLB,, | Performed by: INTERNAL MEDICINE

## 2022-01-19 PROCEDURE — 99999 PR PBB SHADOW E&M-EST. PATIENT-LVL IV: CPT | Mod: PBBFAC,,, | Performed by: INTERNAL MEDICINE

## 2022-01-19 PROCEDURE — 3008F BODY MASS INDEX DOCD: CPT | Mod: CPTII,S$GLB,, | Performed by: INTERNAL MEDICINE

## 2022-01-19 PROCEDURE — 1159F MED LIST DOCD IN RCRD: CPT | Mod: CPTII,S$GLB,, | Performed by: INTERNAL MEDICINE

## 2022-01-19 PROCEDURE — 3078F PR MOST RECENT DIASTOLIC BLOOD PRESSURE < 80 MM HG: ICD-10-PCS | Mod: CPTII,S$GLB,, | Performed by: INTERNAL MEDICINE

## 2022-01-19 PROCEDURE — 3008F PR BODY MASS INDEX (BMI) DOCUMENTED: ICD-10-PCS | Mod: CPTII,S$GLB,, | Performed by: INTERNAL MEDICINE

## 2022-01-19 PROCEDURE — 99214 OFFICE O/P EST MOD 30 MIN: CPT | Mod: S$GLB,,, | Performed by: INTERNAL MEDICINE

## 2022-01-19 PROCEDURE — 3075F PR MOST RECENT SYSTOLIC BLOOD PRESS GE 130-139MM HG: ICD-10-PCS | Mod: CPTII,S$GLB,, | Performed by: INTERNAL MEDICINE

## 2022-01-19 RX ORDER — DEXTROAMPHETAMINE SULFATE, DEXTROAMPHETAMINE SACCHARATE, AMPHETAMINE ASPARTATE MONOHYDRATE, AND AMPHETAMINE SULFATE 6.25; 6.25; 6.25; 6.25 MG/1; MG/1; MG/1; MG/1
1 CAPSULE, EXTENDED RELEASE ORAL DAILY
Qty: 30 EACH | Refills: 0 | Status: CANCELLED | OUTPATIENT
Start: 2022-01-19

## 2022-01-19 RX ORDER — DEXTROAMPHETAMINE SULFATE, DEXTROAMPHETAMINE SACCHARATE, AMPHETAMINE ASPARTATE MONOHYDRATE, AND AMPHETAMINE SULFATE 6.25; 6.25; 6.25; 6.25 MG/1; MG/1; MG/1; MG/1
1 CAPSULE, EXTENDED RELEASE ORAL DAILY
Qty: 30 EACH | Refills: 0 | Status: SHIPPED | OUTPATIENT
Start: 2022-01-19 | End: 2022-02-27 | Stop reason: SDUPTHER

## 2022-01-19 NOTE — PROGRESS NOTES
Subjective:       Patient ID: Aj Marte is a 61 y.o. male.    Chief Complaint: Breast Pain    HPI 61-year-old male presents to clinic today with a 2 month history of breast pain in the area older and nipple region on the right side there is no family history of breast cancer he reports the end area has been tender and swollen.  He does drink 3-4 diet sodas per day no significant coffee rare Tea use no THC use.  Review of Systems    otherwise negative  Objective:      Physical Exam  breast exam there is some enlargement of the left breast in some asymmetry he has some fullness and tenderness in the left areola region  Assessment:       Problem List Items Addressed This Visit     Attention deficit hyperactivity disorder (ADHD) - Primary    Relevant Medications    MYDAYIS 25 mg CT24      Other Visit Diagnoses     Breast mass in male        Relevant Orders    US Breast Left Limited    Mammo Digital Diagnostic Left    Breast pain        Relevant Orders    US Breast Left Limited    Mammo Digital Diagnostic Left          Plan:       Aj was seen today for breast pain.    Diagnoses and all orders for this visit:    Attention deficit hyperactivity disorder (ADHD), unspecified ADHD type  -     MYDAYIS 25 mg CT24; Take 1 capsule by mouth once daily.  Controlled.  Continue current medical regimen.  Prescription refills addressed.  Followup advised. See after visit summary.  Breast mass in male  -     US Breast Left Limited; Future  -     Mammo Digital Diagnostic Left; Future  Exam is most consistent with a possible cyst rule out solid mass proceed with imaging.  Breast pain  -     US Breast Left Limited; Future  -     Mammo Digital Diagnostic Left; Future

## 2022-01-28 ENCOUNTER — HOSPITAL ENCOUNTER (OUTPATIENT)
Dept: RADIOLOGY | Facility: HOSPITAL | Age: 62
Discharge: HOME | End: 2022-01-28
Attending: INTERNAL MEDICINE
Payer: COMMERCIAL

## 2022-01-28 DIAGNOSIS — N64.4 BREAST PAIN: ICD-10-CM

## 2022-01-28 DIAGNOSIS — N63.0 BREAST MASS IN MALE: ICD-10-CM

## 2022-01-28 PROCEDURE — 77062 BREAST TOMOSYNTHESIS BI: CPT | Mod: TC,PO

## 2022-01-28 PROCEDURE — 76642 ULTRASOUND BREAST LIMITED: CPT | Mod: TC,PO,LT

## 2022-02-27 DIAGNOSIS — F90.9 ATTENTION DEFICIT HYPERACTIVITY DISORDER (ADHD), UNSPECIFIED ADHD TYPE: ICD-10-CM

## 2022-02-27 NOTE — TELEPHONE ENCOUNTER
Care Due:                  Date            Visit Type   Department     Provider  --------------------------------------------------------------------------------                                EP -                              PRIMARY      [facility] INTERNAL  Last Visit: 01-      CARE ([facility])   MEDICINE       Alecia Mcghee                              EP -                              PRIMARY      [facility] INTERNAL  Next Visit: 04-      CARE ([facility])   MEDICINE       Alecia Mcghee                                                            Last  Test          Frequency    Reason                     Performed    Due Date  --------------------------------------------------------------------------------    CMP.........  12 months..  atorvastatin.............  02- 02-    Lipid Panel.  12 months..  atorvastatin.............  02- 02-    Powered by [employer]. Reference number: [id number].   2/27/2022 11:31:58 AM CST

## 2022-02-28 RX ORDER — DEXTROAMPHETAMINE SULFATE, DEXTROAMPHETAMINE SACCHARATE, AMPHETAMINE ASPARTATE MONOHYDRATE, AND AMPHETAMINE SULFATE 6.25; 6.25; 6.25; 6.25 MG/1; MG/1; MG/1; MG/1
1 CAPSULE, EXTENDED RELEASE ORAL DAILY
Qty: 30 EACH | Refills: 0 | Status: SHIPPED | OUTPATIENT
Start: 2022-02-28 | End: 2022-04-01 | Stop reason: SDUPTHER

## 2022-04-01 DIAGNOSIS — F90.9 ATTENTION DEFICIT HYPERACTIVITY DISORDER (ADHD), UNSPECIFIED ADHD TYPE: ICD-10-CM

## 2022-04-01 RX ORDER — DEXTROAMPHETAMINE SULFATE, DEXTROAMPHETAMINE SACCHARATE, AMPHETAMINE ASPARTATE MONOHYDRATE, AND AMPHETAMINE SULFATE 6.25; 6.25; 6.25; 6.25 MG/1; MG/1; MG/1; MG/1
1 CAPSULE, EXTENDED RELEASE ORAL DAILY
Qty: 30 EACH | Refills: 0 | Status: SHIPPED | OUTPATIENT
Start: 2022-04-01 | End: 2022-05-13 | Stop reason: SDUPTHER

## 2022-04-01 RX ORDER — DEXTROAMPHETAMINE SULFATE, DEXTROAMPHETAMINE SACCHARATE, AMPHETAMINE ASPARTATE MONOHYDRATE, AND AMPHETAMINE SULFATE 6.25; 6.25; 6.25; 6.25 MG/1; MG/1; MG/1; MG/1
1 CAPSULE, EXTENDED RELEASE ORAL DAILY
Qty: 30 EACH | Refills: 0 | Status: CANCELLED | OUTPATIENT
Start: 2022-04-01

## 2022-04-01 NOTE — TELEPHONE ENCOUNTER
No new care gaps identified.  Powered by [employer]. Reference number: [id number].   4/01/2022 9:39:14 AM CDT

## 2022-04-01 NOTE — TELEPHONE ENCOUNTER
No new care gaps identified.  Powered by [employer]. Reference number: [id number].   4/01/2022 9:44:21 AM CDT

## 2022-04-14 ENCOUNTER — LAB VISIT (OUTPATIENT)
Dept: LAB | Facility: HOSPITAL | Age: 62
End: 2022-04-14
Attending: INTERNAL MEDICINE
Payer: COMMERCIAL

## 2022-04-14 DIAGNOSIS — E78.5 DYSLIPIDEMIA: ICD-10-CM

## 2022-04-14 LAB
ALBUMIN SERPL BCP-MCNC: 4 G/DL (ref 3.5–5.2)
ALP SERPL-CCNC: 63 U/L (ref 55–135)
ALT SERPL W/O P-5'-P-CCNC: 35 U/L (ref 10–44)
ANION GAP SERPL CALC-SCNC: 8 MMOL/L (ref 8–16)
AST SERPL-CCNC: 26 U/L (ref 10–40)
BASOPHILS # BLD AUTO: 0.05 K/UL (ref 0–0.2)
BASOPHILS NFR BLD: 0.7 % (ref 0–1.9)
BILIRUB SERPL-MCNC: 0.6 MG/DL (ref 0.1–1)
BUN SERPL-MCNC: 16 MG/DL (ref 6–20)
CALCIUM SERPL-MCNC: 9.9 MG/DL (ref 8.7–10.5)
CHLORIDE SERPL-SCNC: 104 MMOL/L (ref 95–110)
CHOLEST SERPL-MCNC: 136 MG/DL (ref 120–199)
CHOLEST/HDLC SERPL: 3.2 {RATIO} (ref 2–5)
CO2 SERPL-SCNC: 27 MMOL/L (ref 23–29)
CREAT SERPL-MCNC: 1 MG/DL (ref 0.5–1.4)
DIFFERENTIAL METHOD: NORMAL
EOSINOPHIL # BLD AUTO: 0.2 K/UL (ref 0–0.5)
EOSINOPHIL NFR BLD: 2.2 % (ref 0–8)
ERYTHROCYTE [DISTWIDTH] IN BLOOD BY AUTOMATED COUNT: 12.5 % (ref 11.5–14.5)
EST. GFR  (AFRICAN AMERICAN): >60 ML/MIN/1.73 M^2
EST. GFR  (NON AFRICAN AMERICAN): >60 ML/MIN/1.73 M^2
GLUCOSE SERPL-MCNC: 111 MG/DL (ref 70–110)
HCT VFR BLD AUTO: 46 % (ref 40–54)
HDLC SERPL-MCNC: 42 MG/DL (ref 40–75)
HDLC SERPL: 30.9 % (ref 20–50)
HGB BLD-MCNC: 15.1 G/DL (ref 14–18)
IMM GRANULOCYTES # BLD AUTO: 0.03 K/UL (ref 0–0.04)
IMM GRANULOCYTES NFR BLD AUTO: 0.4 % (ref 0–0.5)
LDLC SERPL CALC-MCNC: 82.4 MG/DL (ref 63–159)
LYMPHOCYTES # BLD AUTO: 1.7 K/UL (ref 1–4.8)
LYMPHOCYTES NFR BLD: 25 % (ref 18–48)
MCH RBC QN AUTO: 28.1 PG (ref 27–31)
MCHC RBC AUTO-ENTMCNC: 32.8 G/DL (ref 32–36)
MCV RBC AUTO: 86 FL (ref 82–98)
MONOCYTES # BLD AUTO: 0.7 K/UL (ref 0.3–1)
MONOCYTES NFR BLD: 10.1 % (ref 4–15)
NEUTROPHILS # BLD AUTO: 4.2 K/UL (ref 1.8–7.7)
NEUTROPHILS NFR BLD: 61.6 % (ref 38–73)
NONHDLC SERPL-MCNC: 94 MG/DL
NRBC BLD-RTO: 0 /100 WBC
PLATELET # BLD AUTO: 174 K/UL (ref 150–450)
PMV BLD AUTO: 11.3 FL (ref 9.2–12.9)
POTASSIUM SERPL-SCNC: 4.3 MMOL/L (ref 3.5–5.1)
PROSTATE SPECIFIC ANTIGEN, TOTAL: 2.2 NG/ML (ref 0–4)
PROT SERPL-MCNC: 7.2 G/DL (ref 6–8.4)
PSA FREE MFR SERPL: 30 %
PSA FREE SERPL-MCNC: 0.66 NG/ML (ref 0–1.5)
RBC # BLD AUTO: 5.38 M/UL (ref 4.6–6.2)
SODIUM SERPL-SCNC: 139 MMOL/L (ref 136–145)
TRIGL SERPL-MCNC: 58 MG/DL (ref 30–150)
TSH SERPL DL<=0.005 MIU/L-ACNC: 2.8 UIU/ML (ref 0.4–4)
WBC # BLD AUTO: 6.84 K/UL (ref 3.9–12.7)

## 2022-04-14 PROCEDURE — 36415 COLL VENOUS BLD VENIPUNCTURE: CPT | Mod: PO | Performed by: INTERNAL MEDICINE

## 2022-04-14 PROCEDURE — 84443 ASSAY THYROID STIM HORMONE: CPT | Performed by: INTERNAL MEDICINE

## 2022-04-14 PROCEDURE — 84154 ASSAY OF PSA FREE: CPT | Performed by: INTERNAL MEDICINE

## 2022-04-14 PROCEDURE — 85025 COMPLETE CBC W/AUTO DIFF WBC: CPT | Performed by: INTERNAL MEDICINE

## 2022-04-14 PROCEDURE — 80061 LIPID PANEL: CPT | Performed by: INTERNAL MEDICINE

## 2022-04-14 PROCEDURE — 84153 ASSAY OF PSA TOTAL: CPT | Performed by: INTERNAL MEDICINE

## 2022-04-14 PROCEDURE — 80053 COMPREHEN METABOLIC PANEL: CPT | Performed by: INTERNAL MEDICINE

## 2022-04-25 ENCOUNTER — OFFICE VISIT (OUTPATIENT)
Dept: INTERNAL MEDICINE | Facility: CLINIC | Age: 62
End: 2022-04-25
Payer: COMMERCIAL

## 2022-04-25 VITALS
WEIGHT: 296.94 LBS | SYSTOLIC BLOOD PRESSURE: 138 MMHG | HEART RATE: 82 BPM | BODY MASS INDEX: 40.22 KG/M2 | OXYGEN SATURATION: 96 % | HEIGHT: 72 IN | DIASTOLIC BLOOD PRESSURE: 85 MMHG

## 2022-04-25 DIAGNOSIS — F90.9 ATTENTION DEFICIT HYPERACTIVITY DISORDER (ADHD), UNSPECIFIED ADHD TYPE: ICD-10-CM

## 2022-04-25 DIAGNOSIS — E78.5 DYSLIPIDEMIA: ICD-10-CM

## 2022-04-25 DIAGNOSIS — E66.01 MORBID OBESITY WITH BMI OF 40.0-44.9, ADULT: ICD-10-CM

## 2022-04-25 DIAGNOSIS — Z00.00 PREVENTATIVE HEALTH CARE: Primary | ICD-10-CM

## 2022-04-25 PROCEDURE — 3075F SYST BP GE 130 - 139MM HG: CPT | Mod: CPTII,S$GLB,, | Performed by: INTERNAL MEDICINE

## 2022-04-25 PROCEDURE — 99396 PR PREVENTIVE VISIT,EST,40-64: ICD-10-PCS | Mod: S$GLB,,, | Performed by: INTERNAL MEDICINE

## 2022-04-25 PROCEDURE — 1159F PR MEDICATION LIST DOCUMENTED IN MEDICAL RECORD: ICD-10-PCS | Mod: CPTII,S$GLB,, | Performed by: INTERNAL MEDICINE

## 2022-04-25 PROCEDURE — 3079F DIAST BP 80-89 MM HG: CPT | Mod: CPTII,S$GLB,, | Performed by: INTERNAL MEDICINE

## 2022-04-25 PROCEDURE — 3079F PR MOST RECENT DIASTOLIC BLOOD PRESSURE 80-89 MM HG: ICD-10-PCS | Mod: CPTII,S$GLB,, | Performed by: INTERNAL MEDICINE

## 2022-04-25 PROCEDURE — 99999 PR PBB SHADOW E&M-EST. PATIENT-LVL IV: ICD-10-PCS | Mod: PBBFAC,,, | Performed by: INTERNAL MEDICINE

## 2022-04-25 PROCEDURE — 99999 PR PBB SHADOW E&M-EST. PATIENT-LVL IV: CPT | Mod: PBBFAC,,, | Performed by: INTERNAL MEDICINE

## 2022-04-25 PROCEDURE — 99396 PREV VISIT EST AGE 40-64: CPT | Mod: S$GLB,,, | Performed by: INTERNAL MEDICINE

## 2022-04-25 PROCEDURE — 3075F PR MOST RECENT SYSTOLIC BLOOD PRESS GE 130-139MM HG: ICD-10-PCS | Mod: CPTII,S$GLB,, | Performed by: INTERNAL MEDICINE

## 2022-04-25 PROCEDURE — 3008F PR BODY MASS INDEX (BMI) DOCUMENTED: ICD-10-PCS | Mod: CPTII,S$GLB,, | Performed by: INTERNAL MEDICINE

## 2022-04-25 PROCEDURE — 1159F MED LIST DOCD IN RCRD: CPT | Mod: CPTII,S$GLB,, | Performed by: INTERNAL MEDICINE

## 2022-04-25 PROCEDURE — 3008F BODY MASS INDEX DOCD: CPT | Mod: CPTII,S$GLB,, | Performed by: INTERNAL MEDICINE

## 2022-04-25 PROCEDURE — 1160F RVW MEDS BY RX/DR IN RCRD: CPT | Mod: CPTII,S$GLB,, | Performed by: INTERNAL MEDICINE

## 2022-04-25 PROCEDURE — 1160F PR REVIEW ALL MEDS BY PRESCRIBER/CLIN PHARMACIST DOCUMENTED: ICD-10-PCS | Mod: CPTII,S$GLB,, | Performed by: INTERNAL MEDICINE

## 2022-04-25 NOTE — PROGRESS NOTES
Subjective:       Patient ID: Aj Marte is a 61 y.o. male.    Chief Complaint: Results, Hypertension, and Annual Exam    HPI 61-year-old male presents to clinic today for annual physical follow-up  dyslipidemia ADD.  Patient is doing well on medication denies side effects  Review of Systems    otherwise negative  Objective:      Physical Exam  General: Well-appearing, well-nourished.  No distress  HEENT: conjunctivae are normal.  Pupils are equal and reative to light.  TM's are clear and intact bilaterally.  Hearing is grossly normal.  Nasopharynx is clear.  Oropharynx is clear.  Neck: Supple.  No thyroid megaly.  No bruits.  Lymph: No cervical or supraclavicular adenopathy.  Heart: Regular rate and rhythm, without murmur, rub or gallop.  Lungs: Clear to auscultation; respiratory effort normal.  Abdomen: Soft, nontender, nondistended.  Normoactive bowel sounds.  No hepatomegaly.  No masses.  Extremities: Good distal pulses.  No edema.  Psych: Oriented to time person place.  Judgment and insight seem unimpaired.  Mood and affect are appropriate.  Assessment:       Problem List Items Addressed This Visit     Dyslipidemia    Attention deficit hyperactivity disorder (ADHD)      Other Visit Diagnoses     Preventative health care    -  Primary    Morbid obesity with BMI of 40.0-44.9, adult              Plan:       Aj was seen today for results, hypertension and annual exam.    Diagnoses and all orders for this visit:    Preventative health care  Monitor blood pressure in the outpatient setting recommend low-sodium diet and weight loss follow-up scheduled in 4 months  Dyslipidemia  Controlled.  Continue current medical regimen.  Prescription refills addressed.  Followup advised. See after visit summary.  Attention deficit hyperactivity disorder (ADHD), unspecified ADHD type  Controlled.  Continue current medical regimen.  Prescription refills addressed.  Followup advised. See after visit summary.  Morbid obesity  with BMI of 40.0-44.9, adult  See above

## 2022-08-02 ENCOUNTER — PATIENT MESSAGE (OUTPATIENT)
Dept: INTERNAL MEDICINE | Facility: CLINIC | Age: 62
End: 2022-08-02
Payer: COMMERCIAL

## 2022-08-12 ENCOUNTER — PATIENT MESSAGE (OUTPATIENT)
Dept: INTERNAL MEDICINE | Facility: CLINIC | Age: 62
End: 2022-08-12
Payer: COMMERCIAL

## 2022-09-16 ENCOUNTER — OFFICE VISIT (OUTPATIENT)
Dept: INTERNAL MEDICINE | Facility: CLINIC | Age: 62
End: 2022-09-16
Payer: COMMERCIAL

## 2022-09-16 VITALS
HEART RATE: 85 BPM | SYSTOLIC BLOOD PRESSURE: 130 MMHG | WEIGHT: 291.25 LBS | BODY MASS INDEX: 40.77 KG/M2 | DIASTOLIC BLOOD PRESSURE: 78 MMHG | HEIGHT: 71 IN

## 2022-09-16 DIAGNOSIS — Z86.16 HISTORY OF COVID-19: ICD-10-CM

## 2022-09-16 DIAGNOSIS — Z23 NEED FOR TDAP VACCINATION: ICD-10-CM

## 2022-09-16 DIAGNOSIS — Z00.00 PREVENTATIVE HEALTH CARE: ICD-10-CM

## 2022-09-16 DIAGNOSIS — F90.9 ATTENTION DEFICIT HYPERACTIVITY DISORDER (ADHD), UNSPECIFIED ADHD TYPE: ICD-10-CM

## 2022-09-16 DIAGNOSIS — E78.5 DYSLIPIDEMIA: Primary | ICD-10-CM

## 2022-09-16 PROCEDURE — 90471 IMMUNIZATION ADMIN: CPT | Mod: S$GLB,,, | Performed by: INTERNAL MEDICINE

## 2022-09-16 PROCEDURE — 99999 PR PBB SHADOW E&M-EST. PATIENT-LVL IV: ICD-10-PCS | Mod: PBBFAC,,, | Performed by: INTERNAL MEDICINE

## 2022-09-16 PROCEDURE — 1160F PR REVIEW ALL MEDS BY PRESCRIBER/CLIN PHARMACIST DOCUMENTED: ICD-10-PCS | Mod: CPTII,S$GLB,, | Performed by: INTERNAL MEDICINE

## 2022-09-16 PROCEDURE — 90715 TDAP VACCINE GREATER THAN OR EQUAL TO 7YO IM: ICD-10-PCS | Mod: S$GLB,,, | Performed by: INTERNAL MEDICINE

## 2022-09-16 PROCEDURE — 99213 OFFICE O/P EST LOW 20 MIN: CPT | Mod: 25,S$GLB,, | Performed by: INTERNAL MEDICINE

## 2022-09-16 PROCEDURE — 1159F MED LIST DOCD IN RCRD: CPT | Mod: CPTII,S$GLB,, | Performed by: INTERNAL MEDICINE

## 2022-09-16 PROCEDURE — 99213 PR OFFICE/OUTPT VISIT, EST, LEVL III, 20-29 MIN: ICD-10-PCS | Mod: 25,S$GLB,, | Performed by: INTERNAL MEDICINE

## 2022-09-16 PROCEDURE — 1160F RVW MEDS BY RX/DR IN RCRD: CPT | Mod: CPTII,S$GLB,, | Performed by: INTERNAL MEDICINE

## 2022-09-16 PROCEDURE — 3078F DIAST BP <80 MM HG: CPT | Mod: CPTII,S$GLB,, | Performed by: INTERNAL MEDICINE

## 2022-09-16 PROCEDURE — 90715 TDAP VACCINE 7 YRS/> IM: CPT | Mod: S$GLB,,, | Performed by: INTERNAL MEDICINE

## 2022-09-16 PROCEDURE — 99999 PR PBB SHADOW E&M-EST. PATIENT-LVL IV: CPT | Mod: PBBFAC,,, | Performed by: INTERNAL MEDICINE

## 2022-09-16 PROCEDURE — 1159F PR MEDICATION LIST DOCUMENTED IN MEDICAL RECORD: ICD-10-PCS | Mod: CPTII,S$GLB,, | Performed by: INTERNAL MEDICINE

## 2022-09-16 PROCEDURE — 3008F BODY MASS INDEX DOCD: CPT | Mod: CPTII,S$GLB,, | Performed by: INTERNAL MEDICINE

## 2022-09-16 PROCEDURE — 3078F PR MOST RECENT DIASTOLIC BLOOD PRESSURE < 80 MM HG: ICD-10-PCS | Mod: CPTII,S$GLB,, | Performed by: INTERNAL MEDICINE

## 2022-09-16 PROCEDURE — 3008F PR BODY MASS INDEX (BMI) DOCUMENTED: ICD-10-PCS | Mod: CPTII,S$GLB,, | Performed by: INTERNAL MEDICINE

## 2022-09-16 PROCEDURE — 3075F SYST BP GE 130 - 139MM HG: CPT | Mod: CPTII,S$GLB,, | Performed by: INTERNAL MEDICINE

## 2022-09-16 PROCEDURE — 3075F PR MOST RECENT SYSTOLIC BLOOD PRESS GE 130-139MM HG: ICD-10-PCS | Mod: CPTII,S$GLB,, | Performed by: INTERNAL MEDICINE

## 2022-09-16 PROCEDURE — 90471 TDAP VACCINE GREATER THAN OR EQUAL TO 7YO IM: ICD-10-PCS | Mod: S$GLB,,, | Performed by: INTERNAL MEDICINE

## 2022-09-16 RX ORDER — DEXTROAMPHETAMINE SULFATE, DEXTROAMPHETAMINE SACCHARATE, AMPHETAMINE ASPARTATE MONOHYDRATE, AND AMPHETAMINE SULFATE 6.25; 6.25; 6.25; 6.25 MG/1; MG/1; MG/1; MG/1
1 CAPSULE, EXTENDED RELEASE ORAL DAILY
Qty: 30 EACH | Refills: 0 | Status: SHIPPED | OUTPATIENT
Start: 2022-09-16 | End: 2023-03-20 | Stop reason: SDUPTHER

## 2022-09-19 ENCOUNTER — TELEPHONE (OUTPATIENT)
Dept: PHARMACY | Facility: CLINIC | Age: 62
End: 2022-09-19
Payer: COMMERCIAL

## 2022-09-19 NOTE — PROGRESS NOTES
Subjective:       Patient ID: Aj Marte is a 62 y.o. male.    Chief Complaint: Follow-up     HPI male presents to clinic today for follow-up ADHD follow-up and dyslipidemia.  Patient is having significant difficulty getting his ADHD medication mydayis which he has been on for years.  Previously treated with Vyvanse and change to this medication for greater efficacy.  Review of Systems    Otherwise negative  Objective:      Physical Exam  General: Well-appearing, well-nourished.  No distress  HEENT: conjunctivae are normal.  Pupils are equal and reative to light.  TM's are clear and intact bilaterally.  Hearing is grossly normal.  Nasopharynx is clear.  Oropharynx is clear.  Neck: Supple.  No thyroid megaly.  No bruits.  Lymph: No cervical or supraclavicular adenopathy.  Heart: Regular rate and rhythm, without murmur, rub or gallop.  Lungs: Clear to auscultation; respiratory effort normal.  Abdomen: Soft, nontender, nondistended.  Normoactive bowel sounds.  No hepatomegaly.  No masses.  Extremities: Good distal pulses.  No edema.  Psych: Oriented to time person place.  Judgment and insight seem unimpaired.  Mood and affect are appropriate.  Assessment:       Problem List Items Addressed This Visit       Dyslipidemia - Primary    Attention deficit hyperactivity disorder (ADHD)    Relevant Medications    MYDAYIS 25 mg CT24    History of COVID-19     Other Visit Diagnoses       Need for Tdap vaccination        Relevant Orders    (In Office Administered) Tdap Vaccine (Completed)    Preventative health care        Relevant Orders    CBC Auto Differential    Comprehensive Metabolic Panel    TSH    Lipid Panel    PSA, Screening              Plan:       Aj was seen today for follow-up.    Diagnoses and all orders for this visit:    Dyslipidemia  Controlled.  Continue current medical regimen.  Prescription refills addressed.  Followup advised. See after visit summary.  Attention deficit hyperactivity disorder  (ADHD), unspecified ADHD type  -     MYDAYIS 25 mg CT24; Take 1 capsule by mouth once daily.  Sent to our local [facility] pharmacy for prior authorization  Need for Tdap vaccination  -     (In Office Administered) Tdap Vaccine    Preventative health care  -     CBC Auto Differential; Future  -     Comprehensive Metabolic Panel; Future  -     TSH; Future  -     Lipid Panel; Future  -     PSA, Screening; Future  Scheduled  History of COVID-19

## 2022-10-21 ENCOUNTER — HOSPITAL ENCOUNTER (OUTPATIENT)
Facility: HOSPITAL | Age: 62
Discharge: HOME | End: 2022-10-22
Attending: STUDENT IN AN ORGANIZED HEALTH CARE EDUCATION/TRAINING PROGRAM | Admitting: FAMILY MEDICINE
Payer: COMMERCIAL

## 2022-10-21 DIAGNOSIS — I47.10 SVT (SUPRAVENTRICULAR TACHYCARDIA): ICD-10-CM

## 2022-10-21 DIAGNOSIS — R07.9 CHEST PAIN: ICD-10-CM

## 2022-10-21 DIAGNOSIS — R00.2 PALPITATION: ICD-10-CM

## 2022-10-21 DIAGNOSIS — R00.0 TACHYCARDIA: ICD-10-CM

## 2022-10-21 PROBLEM — R94.31 QT PROLONGATION: Status: ACTIVE | Noted: 2022-10-21

## 2022-10-21 LAB
ALBUMIN SERPL BCP-MCNC: 4.2 G/DL (ref 3.5–5.2)
ALP SERPL-CCNC: 78 U/L (ref 55–135)
ALT SERPL W/O P-5'-P-CCNC: 41 U/L (ref 10–44)
ANION GAP SERPL CALC-SCNC: 14 MMOL/L (ref 8–16)
AST SERPL-CCNC: 54 U/L (ref 10–40)
BASOPHILS # BLD AUTO: 0.05 K/UL (ref 0–0.2)
BASOPHILS NFR BLD: 0.5 % (ref 0–1.9)
BILIRUB SERPL-MCNC: 0.5 MG/DL (ref 0.1–1)
BNP SERPL-MCNC: 10 PG/ML (ref 0–99)
BUN SERPL-MCNC: 14 MG/DL (ref 6–20)
CALCIUM SERPL-MCNC: 9.9 MG/DL (ref 8.7–10.5)
CHLORIDE SERPL-SCNC: 106 MMOL/L (ref 95–110)
CO2 SERPL-SCNC: 20 MMOL/L (ref 23–29)
CREAT SERPL-MCNC: 1.1 MG/DL (ref 0.5–1.4)
DIFFERENTIAL METHOD: ABNORMAL
EOSINOPHIL # BLD AUTO: 0.1 K/UL (ref 0–0.5)
EOSINOPHIL NFR BLD: 0.8 % (ref 0–8)
ERYTHROCYTE [DISTWIDTH] IN BLOOD BY AUTOMATED COUNT: 13 % (ref 11.5–14.5)
EST. GFR  (NO RACE VARIABLE): >60 ML/MIN/1.73 M^2
GLUCOSE SERPL-MCNC: 101 MG/DL (ref 70–110)
HCT VFR BLD AUTO: 48.9 % (ref 40–54)
HGB BLD-MCNC: 16.3 G/DL (ref 14–18)
IMM GRANULOCYTES # BLD AUTO: 0.05 K/UL (ref 0–0.04)
IMM GRANULOCYTES NFR BLD AUTO: 0.5 % (ref 0–0.5)
LYMPHOCYTES # BLD AUTO: 1.8 K/UL (ref 1–4.8)
LYMPHOCYTES NFR BLD: 17.6 % (ref 18–48)
MAGNESIUM SERPL-MCNC: 1.7 MG/DL (ref 1.6–2.6)
MCH RBC QN AUTO: 28.2 PG (ref 27–31)
MCHC RBC AUTO-ENTMCNC: 33.3 G/DL (ref 32–36)
MCV RBC AUTO: 85 FL (ref 82–98)
MONOCYTES # BLD AUTO: 0.9 K/UL (ref 0.3–1)
MONOCYTES NFR BLD: 9.1 % (ref 4–15)
NEUTROPHILS # BLD AUTO: 7.3 K/UL (ref 1.8–7.7)
NEUTROPHILS NFR BLD: 71.5 % (ref 38–73)
NRBC BLD-RTO: 0 /100 WBC
PLATELET # BLD AUTO: 192 K/UL (ref 150–450)
PMV BLD AUTO: 11.3 FL (ref 9.2–12.9)
POTASSIUM SERPL-SCNC: 5 MMOL/L (ref 3.5–5.1)
PROT SERPL-MCNC: 8.1 G/DL (ref 6–8.4)
RBC # BLD AUTO: 5.78 M/UL (ref 4.6–6.2)
SODIUM SERPL-SCNC: 140 MMOL/L (ref 136–145)
TROPONIN I SERPL DL<=0.01 NG/ML-MCNC: 0.07 NG/ML (ref 0–0.03)
TROPONIN I SERPL DL<=0.01 NG/ML-MCNC: 0.27 NG/ML (ref 0–0.03)
TSH SERPL DL<=0.005 MIU/L-ACNC: 0.5 UIU/ML (ref 0.4–4)
WBC # BLD AUTO: 10.24 K/UL (ref 3.9–12.7)

## 2022-10-21 PROCEDURE — 80053 COMPREHEN METABOLIC PANEL: CPT | Performed by: STUDENT IN AN ORGANIZED HEALTH CARE EDUCATION/TRAINING PROGRAM

## 2022-10-21 PROCEDURE — 63600175 PHARM REV CODE 636 W HCPCS: Performed by: HOSPITALIST

## 2022-10-21 PROCEDURE — G0378 HOSPITAL OBSERVATION PER HR: HCPCS

## 2022-10-21 PROCEDURE — 93005 ELECTROCARDIOGRAM TRACING: CPT

## 2022-10-21 PROCEDURE — 93010 ELECTROCARDIOGRAM REPORT: CPT | Mod: 76,,, | Performed by: INTERNAL MEDICINE

## 2022-10-21 PROCEDURE — 84484 ASSAY OF TROPONIN QUANT: CPT | Performed by: STUDENT IN AN ORGANIZED HEALTH CARE EDUCATION/TRAINING PROGRAM

## 2022-10-21 PROCEDURE — 99285 EMERGENCY DEPT VISIT HI MDM: CPT | Mod: 25

## 2022-10-21 PROCEDURE — 93010 EKG 12-LEAD: ICD-10-PCS | Mod: 76,,, | Performed by: INTERNAL MEDICINE

## 2022-10-21 PROCEDURE — 84484 ASSAY OF TROPONIN QUANT: CPT | Mod: 91 | Performed by: HOSPITALIST

## 2022-10-21 PROCEDURE — 83880 ASSAY OF NATRIURETIC PEPTIDE: CPT | Performed by: STUDENT IN AN ORGANIZED HEALTH CARE EDUCATION/TRAINING PROGRAM

## 2022-10-21 PROCEDURE — 96372 THER/PROPH/DIAG INJ SC/IM: CPT | Performed by: HOSPITALIST

## 2022-10-21 PROCEDURE — 25000003 PHARM REV CODE 250: Performed by: STUDENT IN AN ORGANIZED HEALTH CARE EDUCATION/TRAINING PROGRAM

## 2022-10-21 PROCEDURE — 83735 ASSAY OF MAGNESIUM: CPT | Performed by: STUDENT IN AN ORGANIZED HEALTH CARE EDUCATION/TRAINING PROGRAM

## 2022-10-21 PROCEDURE — 84443 ASSAY THYROID STIM HORMONE: CPT | Performed by: STUDENT IN AN ORGANIZED HEALTH CARE EDUCATION/TRAINING PROGRAM

## 2022-10-21 PROCEDURE — 25000003 PHARM REV CODE 250: Performed by: HOSPITALIST

## 2022-10-21 PROCEDURE — 85025 COMPLETE CBC W/AUTO DIFF WBC: CPT | Performed by: STUDENT IN AN ORGANIZED HEALTH CARE EDUCATION/TRAINING PROGRAM

## 2022-10-21 PROCEDURE — 96360 HYDRATION IV INFUSION INIT: CPT

## 2022-10-21 PROCEDURE — 93010 ELECTROCARDIOGRAM REPORT: CPT | Mod: ,,, | Performed by: INTERNAL MEDICINE

## 2022-10-21 RX ORDER — ACETAMINOPHEN 325 MG/1
650 TABLET ORAL EVERY 4 HOURS PRN
Status: DISCONTINUED | OUTPATIENT
Start: 2022-10-21 | End: 2022-10-22 | Stop reason: HOSPADM

## 2022-10-21 RX ORDER — CLOBETASOL PROPIONATE 0.5 MG/G
CREAM TOPICAL
COMMUNITY
Start: 2022-07-25 | End: 2022-10-21

## 2022-10-21 RX ORDER — IBUPROFEN 200 MG
16 TABLET ORAL
Status: DISCONTINUED | OUTPATIENT
Start: 2022-10-21 | End: 2022-10-22 | Stop reason: HOSPADM

## 2022-10-21 RX ORDER — DIPHENHYDRAMINE CITRATE AND IBUPROFEN 38; 200 MG/1; MG/1
1 TABLET, COATED ORAL
COMMUNITY

## 2022-10-21 RX ORDER — KETOCONAZOLE 20 MG/ML
SHAMPOO, SUSPENSION TOPICAL
COMMUNITY
Start: 2022-09-19

## 2022-10-21 RX ORDER — SODIUM CHLORIDE 0.9 % (FLUSH) 0.9 %
10 SYRINGE (ML) INJECTION EVERY 12 HOURS PRN
Status: DISCONTINUED | OUTPATIENT
Start: 2022-10-21 | End: 2022-10-22 | Stop reason: HOSPADM

## 2022-10-21 RX ORDER — HEPARIN SODIUM 5000 [USP'U]/ML
5000 INJECTION, SOLUTION INTRAVENOUS; SUBCUTANEOUS EVERY 8 HOURS
Status: DISCONTINUED | OUTPATIENT
Start: 2022-10-21 | End: 2022-10-22 | Stop reason: HOSPADM

## 2022-10-21 RX ORDER — ASPIRIN 325 MG
325 TABLET ORAL
Status: COMPLETED | OUTPATIENT
Start: 2022-10-21 | End: 2022-10-21

## 2022-10-21 RX ORDER — GLUCAGON 1 MG
1 KIT INJECTION
Status: DISCONTINUED | OUTPATIENT
Start: 2022-10-21 | End: 2022-10-22 | Stop reason: HOSPADM

## 2022-10-21 RX ORDER — LANOLIN ALCOHOL/MO/W.PET/CERES
400 CREAM (GRAM) TOPICAL ONCE
Status: COMPLETED | OUTPATIENT
Start: 2022-10-21 | End: 2022-10-21

## 2022-10-21 RX ORDER — ONDANSETRON 8 MG/1
8 TABLET, ORALLY DISINTEGRATING ORAL EVERY 8 HOURS PRN
Status: DISCONTINUED | OUTPATIENT
Start: 2022-10-21 | End: 2022-10-22 | Stop reason: HOSPADM

## 2022-10-21 RX ORDER — NALOXONE HCL 0.4 MG/ML
0.02 VIAL (ML) INJECTION
Status: DISCONTINUED | OUTPATIENT
Start: 2022-10-21 | End: 2022-10-22 | Stop reason: HOSPADM

## 2022-10-21 RX ORDER — IBUPROFEN 200 MG
24 TABLET ORAL
Status: DISCONTINUED | OUTPATIENT
Start: 2022-10-21 | End: 2022-10-22 | Stop reason: HOSPADM

## 2022-10-21 RX ADMIN — SODIUM CHLORIDE 500 ML: 0.9 INJECTION, SOLUTION INTRAVENOUS at 03:10

## 2022-10-21 RX ADMIN — ASPIRIN 325 MG: 325 TABLET ORAL at 06:10

## 2022-10-21 RX ADMIN — HEPARIN SODIUM 5000 UNITS: 5000 INJECTION INTRAVENOUS; SUBCUTANEOUS at 11:10

## 2022-10-21 RX ADMIN — Medication 400 MG: at 07:10

## 2022-10-21 NOTE — ED PROVIDER NOTES
Encounter Date: 10/21/2022       History   No chief complaint on file.    8-year-old male with no past medical history presents with palpitations beginning earlier today.  The patient says that he has not been drinking enough water and then check his heart rate at home and found have it in the 200s.  Asymptomatic otherwise.  No prior history of this, sympathomimetics or history of heart problems.    Review of patient's allergies indicates:   Allergen Reactions    Opioids - morphine analogues Nausea And Vomiting     Past Medical History:   Diagnosis Date    ADHD (attention deficit hyperactivity disorder)     Hyperlipidemia      Past Surgical History:   Procedure Laterality Date    KNEE SURGERY      ROTATOR CUFF REPAIR      bilateral    VASECTOMY       Family History   Problem Relation Age of Onset    Dementia Mother     Mental illness Mother         dementia    Heart disease Father         CAD  at age 39    Mental illness Son         anxiety ADHD    Heart disease Brother         CAD age  38     Social History     Tobacco Use    Smoking status: Never    Smokeless tobacco: Never   Substance Use Topics    Alcohol use: Not Currently    Drug use: Never     Review of Systems   All other systems reviewed and are negative.    Physical Exam     Initial Vitals [10/21/22 1449]   BP Pulse Resp Temp SpO2   138/84 (!) 209 20 98 °F (36.7 °C) 98 %      MAP       --         Physical Exam    Nursing note and vitals reviewed.  Constitutional: He appears well-developed and well-nourished.   HENT:   Head: Normocephalic and atraumatic.   Eyes: EOM are normal. Pupils are equal, round, and reactive to light.   Neck: Neck supple. No JVD present.   Normal range of motion.  Cardiovascular:            Tachycardic rate, regular rhythm   Pulmonary/Chest: Breath sounds normal. No stridor. No respiratory distress.   Abdominal: Abdomen is soft. There is no abdominal tenderness.   Musculoskeletal:         General: No edema. Normal range of  motion.      Cervical back: Normal range of motion and neck supple.     Neurological: He is alert and oriented to person, place, and time. GCS score is 15. GCS eye subscore is 4. GCS verbal subscore is 5. GCS motor subscore is 6.   Skin: Skin is warm. Capillary refill takes less than 2 seconds.   Psychiatric: He has a normal mood and affect. Thought content normal.       ED Course   Procedures  Labs Reviewed   CBC W/ AUTO DIFFERENTIAL - Abnormal; Notable for the following components:       Result Value    Immature Grans (Abs) 0.05 (*)     Lymph % 17.6 (*)     All other components within normal limits   COMPREHENSIVE METABOLIC PANEL - Abnormal; Notable for the following components:    CO2 20 (*)     AST 54 (*)     All other components within normal limits   TROPONIN I - Abnormal; Notable for the following components:    Troponin I 0.074 (*)     All other components within normal limits   TSH   B-TYPE NATRIURETIC PEPTIDE   MAGNESIUM        ECG Results              EKG 12-lead (Final result)  Result time 10/21/22 16:47:16      Final result by Interface, Lab In [facility] (10/21/22 16:47:16)                   Narrative:    Test Reason : R00.2,    Vent. Rate : 110 BPM     Atrial Rate : 110 BPM     P-R Int : 134 ms          QRS Dur : 144 ms      QT Int : 364 ms       P-R-T Axes : 071 -16 048 degrees     QTc Int : 492 ms    Sinus tachycardia  Right bundle branch block  Prolonged QT  Abnormal ECG  When compared with ECG of 21-OCT-2022 14:47,  Vent. rate has decreased BY  99 BPM  The axis Shifted left  Nonspecific T wave abnormality no longer evident in Inferior leads  Nonspecific T wave abnormality no longer evident in Anterior-lateral leads  Confirmed by Catarina Santos MD ([id number]) on 10/21/2022 4:47:10 PM    Referred By: TIFFANY   SELF           Confirmed By:Catarina Santos MD                                     EKG 12-lead (Final result)  Result time 10/21/22 16:47:03      Final result by Interface, Lab In [facility] (10/21/22  16:47:03)                   Narrative:    Test Reason : R00.0,    Vent. Rate : 209 BPM     Atrial Rate : 209 BPM     P-R Int : 120 ms          QRS Dur : 146 ms      QT Int : 224 ms       P-R-T Axes : 000 163 016 degrees     QTc Int : 417 ms    Supraventricular tachycardia with AV carlie reentry  Right bundle branch block  Possible Inferior infarct ,age undetermined  Abnormal ECG  No previous ECGs available  Confirmed by Catarina Santos MD ([id number]) on 10/21/2022 4:46:50 PM    Referred By: [email or web address]   SELF           Confirmed By:Catarina Santos MD                                  Imaging Results              X-Ray Chest AP Portable (Final result)  Result time 10/21/22 15:51:22      Final result by Art Barnes MD (10/21/22 15:51:22)                   Impression:      No acute process.      Electronically signed by: Art Barnes MD  Date:    10/21/2022  Time:    15:51               Narrative:    EXAMINATION:  XR CHEST AP PORTABLE    CLINICAL HISTORY:  Palpitations    TECHNIQUE:  Single frontal view of the chest was performed.    COMPARISON:  None    FINDINGS:  Monitoring EKG leads are present.  There is a pacer pad overlying the left lower hemithorax.    The trachea is unremarkable.  The cardiomediastinal silhouette is within normal limits.  The hemidiaphragms are unremarkable.  There are no pleural effusions.  There is no evidence of a pneumothorax.  There is no evidence of pneumomediastinum.  No airspace opacity is present.    There are postoperative changes in left shoulder.  There are degenerative changes in the osseous structures.                                       Medications   sodium chloride 0.9% bolus 500 mL (has no administration in time range)     Medical Decision Making:   Initial Assessment:   As above.  Heart rate in the 200s.  Hemodynamically stable otherwise.  Not hypotensive or symptomatic and mentating well.  In vagal maneuvers done at bedside with resolution of the tachy dysrhythmia.  Laboratory studies  ordered showing a mild elevated troponin elevation.  Patient is monitor cardiac monitored his entire stay without increased of his heart rate.  EKG did show March sinus sinus tachycardia with right bundle branch block pattern.  No irregularities. No STEMI.  Repeat EKG showed normal sinus rhythm with a right bundle branch block pattern.  No STEMI.  No evidence of pre-excitation syndrome.  Due to elevation of troponin and his presentation, will admit for observation.  ED Management:  The patient was reassessed frequently and managed aggressively while in the emergency department. Due to the clinical workup and presentation, the patient's condition is concerning and will require additional evaluation. I discussed the objective findings with him including laboratory studies, diagnostic imaging, and any consultant involvement. He was educated on their clinical presentation and all questions were answered. He acknowledged and verbally agreed to the treatment plan. The patient will be admitted to the hospital for further management.     DISCLAIMER: This note was prepared with [employer] voice recognition transcription software. Garbled syntax, mangled pronouns, and other bizarre constructions may be attributed to that software system.                            Clinical Impression:   Final diagnoses:  [R00.0] Tachycardia  [R00.2] Palpitation               Jeovany Potts MD  10/21/22 [id number]

## 2022-10-21 NOTE — Clinical Note
Diagnosis: Tachycardia [814379]   Future Attending Provider: SHAYAN PARDO [[id number]]   Admitting Provider:: ILAN PRADHAN [[id number]]   Special Needs:: No Special Needs [1]

## 2022-10-21 NOTE — ED TRIAGE NOTES
"Patient reports to ED with c/o tachycardia with rate in 200s. Patient states he woke up and felt like his back was "tight." Decided to check his heart rate before leaving on trip and found his heart rate to be 200s and verified with wife's heart rate monitor on fitness watch. Patient denies chest pain, dizziness, hx of arrhythmias.     SVT with rate in 200s relieved with valsalva maneuver in ED hallway. Heart rate decreased to 112 and returned to normal rhythm.     Two patient identifiers have been checked and are correct.      Appearance: Pt awake, alert & oriented to person, place & time. Pt in no acute distress at present time. Pt is clean and well groomed with clothes appropriately fastened.   Skin: Skin warm, dry & intact. Color consistent with ethnicity. Mucous membranes moist. No breakdown or brusing noted.   Musculoskeletal: Patient moving all extremities well, no obvious swelling or deformities noted.   Respiratory: Respirations spontaneous, even, and non-labored. Visible chest rise noted. Airway is open and patent. No accessory muscle use noted.   Neurologic: Sensation is intact. Speech is clear and appropriate. Eyes open spontaneously, behavior appropriate to situation, follows commands, facial expression symmetrical, bilateral hand grasp equal and even, purposeful motor response noted.  Cardiac: All peripheral pulses present. No Bilateral lower extremity edema. Cap refill is <3 seconds.  Abdomen: Abdomen soft, non-tender to palpation.   : Pt reports no dysuria or hematuria.           "

## 2022-10-21 NOTE — ASSESSMENT & PLAN NOTE
QTC prolongation of 492.   Keep mag and K above 2 and 4 respectively.   Telemetry bed  May consider referral to cardio- physiology

## 2022-10-21 NOTE — SUBJECTIVE & OBJECTIVE
Past Medical History:   Diagnosis Date    ADHD (attention deficit hyperactivity disorder)     Hyperlipidemia        Past Surgical History:   Procedure Laterality Date    KNEE SURGERY      ROTATOR CUFF REPAIR      bilateral    VASECTOMY         Review of patient's allergies indicates:   Allergen Reactions    Opioids - morphine analogues Nausea And Vomiting       No current facility-administered medications on file prior to encounter.     Current Outpatient Medications on File Prior to Encounter   Medication Sig    atorvastatin (LIPITOR) 40 MG tablet Take 1 tablet (40 mg total) by mouth once daily.    ibuprofen-diphenhydrAMINE cit (ADVIL PM) 200-38 mg Tab Take 1 tablet by mouth as needed.    ketoconazole (NIZORAL) 2 % shampoo Apply topically. Apply to scalp 2-3 times a week    MYDAYIS 25 mg CT24 Take 1 capsule by mouth once daily. (Patient taking differently: Take 25 mg by mouth once daily.)    [DISCONTINUED] clobetasoL (TEMOVATE) 0.05 % cream PLEASE SEE ATTACHED FOR DETAILED DIRECTIONS     Family History       Problem Relation (Age of Onset)    Dementia Mother    Heart disease Father, Brother    Mental illness Mother, Son          Tobacco Use    Smoking status: Never    Smokeless tobacco: Never   Substance and Sexual Activity    Alcohol use: Not Currently    Drug use: Never    Sexual activity: Yes     Review of Systems   Constitutional:  Negative for activity change, appetite change, diaphoresis and fever.   HENT: Negative.     Eyes: Negative.    Respiratory: Negative.     Cardiovascular:  Positive for palpitations.   Endocrine: Negative.    Genitourinary: Negative.    Musculoskeletal: Negative.    Skin:  Negative for color change.   Allergic/Immunologic: Negative.    Hematological: Negative.    Psychiatric/Behavioral: Negative.     Objective:     Vital Signs (Most Recent):  Temp: 98 °F (36.7 °C) (10/21/22 1449)  Pulse: 101 (10/21/22 1720)  Resp: 16 (10/21/22 1515)  BP: 124/70 (10/21/22 1738)  SpO2: 97 % (10/21/22  1719) Vital Signs (24h Range):  Temp:  [98 °F (36.7 °C)] 98 °F (36.7 °C)  Pulse:  [] 101  Resp:  [16-20] 16  SpO2:  [95 %-98 %] 97 %  BP: (124-138)/(70-84) 124/70        There is no height or weight on file to calculate BMI.    Physical Exam  Constitutional:       Appearance: Normal appearance.   HENT:      Head: Normocephalic and atraumatic.      Right Ear: External ear normal.      Left Ear: External ear normal.      Nose: Nose normal.      Mouth/Throat:      Mouth: Mucous membranes are moist.      Pharynx: Oropharynx is clear.   Eyes:      Pupils: Pupils are equal, round, and reactive to light.   Cardiovascular:      Rate and Rhythm: Normal rate and regular rhythm.      Heart sounds: Normal heart sounds.   Abdominal:      General: Bowel sounds are normal. There is no distension.      Palpations: Abdomen is soft.   Musculoskeletal:         General: Normal range of motion.      Cervical back: Normal range of motion and neck supple.   Skin:     General: Skin is warm and dry.   Neurological:      General: No focal deficit present.      Mental Status: He is alert and oriented to person, place, and time.   Psychiatric:         Mood and Affect: Mood normal.         Behavior: Behavior normal.         CRANIAL NERVES     CN III, IV, VI   Pupils are equal, round, and reactive to light.     Significant Labs: All pertinent labs within the past 24 hours have been reviewed.    Significant Imaging: I have reviewed all pertinent imaging results/findings within the past 24 hours.

## 2022-10-21 NOTE — HPI
63 yo male with history of ADHD and hyperlipidemia presents to ED complaining of palpitation. Patient reports he felt his heart was raising, then check his pulse which was >200 bpm.   Patient symptoms started this morning after he woke up.   He also reports feeling dehydrated. Patient denies alcohol or illicit drug use.   Patient denies chest pain, sob, headaches or numbness.     In the er, patient was found to have tachycardia with HR of 209. His rhythm appeared svt or sinus tachy on the tele monitor.   After vagal massage by ed provider, tachycardia resolved.    Patient remains asymptomatic.

## 2022-10-21 NOTE — PHARMACY MED REC
"[facility]           Pharmacy  Admission Medication History     The home medication history was taken by Brittny Arellano.      Medication history obtained from Medications listed below were obtained from: Patient/family    Based on information gathered for medication list, you may go to "Admission" then "Reconcile Home Medications" tabs to review and/or act upon those items.     The home medication list has been updated by the Pharmacy department.   Please read ALL comments highlighted in yellow.   Please address this information as you see fit.    Feel free to contact us if you have any questions or require assistance.    The medications listed below were removed from the home medication list.  Please reorder if appropriate:    Patient reports NOT TAKING the following medication(s):  Clobetasol cream 0.05%          No current facility-administered medications on file prior to encounter.     Current Outpatient Medications on File Prior to Encounter   Medication Sig Dispense Refill    atorvastatin (LIPITOR) 40 MG tablet Take 1 tablet (40 mg total) by mouth once daily. 90 tablet 3    ibuprofen-diphenhydrAMINE cit (ADVIL PM) 200-38 mg Tab Take 1 tablet by mouth as needed.      ketoconazole (NIZORAL) 2 % shampoo Apply topically. Apply to scalp 2-3 times a week      MYDAYIS 25 mg CT24 Take 1 capsule by mouth once daily. (Patient taking differently: Take 25 mg by mouth once daily.) 30 each 0       Please address this information as you see fit.  Feel free to contact us if you have any questions or require assistance.    Brittny Arellano  [phone]                  .        "

## 2022-10-21 NOTE — ASSESSMENT & PLAN NOTE
Patient presented svt vs sinus tachycardia that resolved with vagal massage.   Unclear etiology. Currently asymptomatic.  Tele bed.  Recheck bmp in am

## 2022-10-22 VITALS
DIASTOLIC BLOOD PRESSURE: 76 MMHG | BODY MASS INDEX: 39.06 KG/M2 | HEART RATE: 82 BPM | OXYGEN SATURATION: 95 % | WEIGHT: 279 LBS | RESPIRATION RATE: 18 BRPM | TEMPERATURE: 98 F | HEIGHT: 71 IN | SYSTOLIC BLOOD PRESSURE: 148 MMHG

## 2022-10-22 PROBLEM — E66.09 CLASS 2 OBESITY DUE TO EXCESS CALORIES WITHOUT SERIOUS COMORBIDITY WITH BODY MASS INDEX (BMI) OF 38.0 TO 38.9 IN ADULT: Status: ACTIVE | Noted: 2022-10-22

## 2022-10-22 PROBLEM — E66.812 CLASS 2 OBESITY DUE TO EXCESS CALORIES WITHOUT SERIOUS COMORBIDITY WITH BODY MASS INDEX (BMI) OF 38.0 TO 38.9 IN ADULT: Status: ACTIVE | Noted: 2022-10-22

## 2022-10-22 LAB
TROPONIN I SERPL DL<=0.01 NG/ML-MCNC: 0.22 NG/ML (ref 0–0.03)
TROPONIN I SERPL DL<=0.01 NG/ML-MCNC: 0.29 NG/ML (ref 0–0.03)

## 2022-10-22 PROCEDURE — G0378 HOSPITAL OBSERVATION PER HR: HCPCS

## 2022-10-22 PROCEDURE — 25000003 PHARM REV CODE 250: Performed by: INTERNAL MEDICINE

## 2022-10-22 PROCEDURE — 63600175 PHARM REV CODE 636 W HCPCS: Performed by: HOSPITALIST

## 2022-10-22 PROCEDURE — 99226 PR SUBSEQUENT OBSERVATION CARE,LEVEL III: CPT | Mod: 25,,, | Performed by: INTERNAL MEDICINE

## 2022-10-22 PROCEDURE — 36415 COLL VENOUS BLD VENIPUNCTURE: CPT | Performed by: HOSPITALIST

## 2022-10-22 PROCEDURE — 84484 ASSAY OF TROPONIN QUANT: CPT | Mod: 91 | Performed by: HOSPITALIST

## 2022-10-22 PROCEDURE — 96372 THER/PROPH/DIAG INJ SC/IM: CPT | Performed by: HOSPITALIST

## 2022-10-22 PROCEDURE — 99226 PR SUBSEQUENT OBSERVATION CARE,LEVEL III: ICD-10-PCS | Mod: 25,,, | Performed by: INTERNAL MEDICINE

## 2022-10-22 RX ORDER — METOPROLOL SUCCINATE 25 MG/1
25 TABLET, EXTENDED RELEASE ORAL DAILY
Qty: 30 TABLET | Refills: 11 | Status: SHIPPED | OUTPATIENT
Start: 2022-10-23 | End: 2022-12-08 | Stop reason: ALTCHOICE

## 2022-10-22 RX ORDER — METOPROLOL SUCCINATE 25 MG/1
25 TABLET, EXTENDED RELEASE ORAL DAILY
Status: DISCONTINUED | OUTPATIENT
Start: 2022-10-22 | End: 2022-10-22 | Stop reason: HOSPADM

## 2022-10-22 RX ADMIN — HEPARIN SODIUM 5000 UNITS: 5000 INJECTION INTRAVENOUS; SUBCUTANEOUS at 01:10

## 2022-10-22 RX ADMIN — HEPARIN SODIUM 5000 UNITS: 5000 INJECTION INTRAVENOUS; SUBCUTANEOUS at 06:10

## 2022-10-22 RX ADMIN — METOPROLOL SUCCINATE 25 MG: 25 TABLET, EXTENDED RELEASE ORAL at 01:10

## 2022-10-22 NOTE — PLAN OF CARE
10/22/22 1520   Discharge Assessment   Assessment Type Discharge Planning Assessment   Confirmed/corrected address, phone number and insurance Yes   Confirmed Demographics Correct on Facesheet   Source of Information patient;family   Does patient/caregiver understand observation status Yes   Communicated ABIMAEL with patient/caregiver Yes   Reason For Admission patient was admitted for heart Palpitations   Lives With spouse   Facility Arrived From: Patient arrived from home   Do you expect to return to your current living situation? Yes   Do you have help at home or someone to help you manage your care at home? Yes   Who are your caregiver(s) and their phone number(s)? Brenda Marte, spouse at [phone]   Prior to hospitilization cognitive status: Alert/Oriented   Current cognitive status: Alert/Oriented   Walking or Climbing Stairs Difficulty none   Dressing/Bathing Difficulty none   Equipment Currently Used at Home none   Readmission within 30 days? No   Patient currently being followed by outpatient case management? No   Do you currently have service(s) that help you manage your care at home? No   Do you take prescription medications? Yes   Do you have prescription coverage? Yes   Coverage Blue Cross /Blue Shield   Do you have any problems affording any of your prescribed medications? No   Is the patient taking medications as prescribed? yes   Who is going to help you get home at discharge? Brenda Marte, spouse at [phone]   How do you get to doctors appointments? car, drives self   Are you on dialysis? No   Do you take coumadin? No   Discharge Plan A Home   Discharge Plan B Home with family   DME Needed Upon Discharge  none   Discharge Plan discussed with: Patient   Discharge Barriers Identified None   Physical Activity   On average, how many days per week do you engage in moderate to strenuous exercise (like a brisk walk)? 6 days   On average, how many minutes do you engage in exercise at this level? 50 min    Financial Resource Strain   How hard is it for you to pay for the very basics like food, housing, medical care, and heating? Not very   Housing Stability   In the last 12 months, was there a time when you were not able to pay the mortgage or rent on time? N   In the last 12 months, how many places have you lived? 1   In the last 12 months, was there a time when you did not have a steady place to sleep or slept in a shelter (including now)? [place]   Transportation Needs   In the past 12 months, has lack of transportation kept you from medical appointments or from getting medications? Patient refu   In the past 12 months, has lack of transportation kept you from meetings, work, or from getting things needed for daily living? Patient refu   Food Insecurity   Within the past 12 months, you worried that your food would run out before you got the money to buy more. Never true   Within the past 12 months, the food you bought just didn't last and you didn't have money to get more. Never true   Stress   Do you feel stress - tense, restless, nervous, or anxious, or unable to sleep at night because your mind is troubled all the time - these days? To some exte   Social Connections   In a typical week, how many times do you talk on the phone with family, friends, or neighbors? More than 3   How often do you get together with friends or relatives? More than 3   How often do you attend Confucianism or Holiness services? Patient refu   Do you belong to any clubs or organizations such as Confucianism groups, unions, fraternal or athletic groups, or school groups? Patient refu   How often do you attend meetings of the clubs or organizations you belong to? Patient refu   Are you , , , , never , or living with a partner? Living with   Alcohol Use   Q1: How often do you have a drink containing alcohol? Patient refu   Q2: How many drinks containing alcohol do you have on a typical day when you are drinking?  Patient refu   Q3: How often do you have six or more drinks on one occasion? Daily   Relationship/Environment   Name(s) of Who Lives With Patient Brenda Marte, spouse at [phone]       The sw met with the pt to complete and assessment.The pt states that he lives at [address] [place] [place] [address]  but he receives all his medical care within the [facility] .The pt has established care with with PCP/ Alecia Mcghee MD.The pt has Blue Cross/Blue shield insurance . The pt's independent with his adl's. The pt still drives but his wife will transport him home at d/c.The sw completed the white board in the pt's room and provided the patient with SW's contact info. The sw encouraged them to call if they have any further questions or concerns.The sw will continue to follow the pt throughout her transitions of care and will assist with any d/c needs.

## 2022-10-22 NOTE — ASSESSMENT & PLAN NOTE
Noted to have retrograde Ps and aborted w/ vagal maneuvers.  Likely AVnRT.   - start metoprolol succinate 25 mg daily   - o/p EP f/u for SVT   - pt may establish care w/ Interventional Cardiology (Dr. Higginbotham) if he desires   - f/u echo results

## 2022-10-22 NOTE — ASSESSMENT & PLAN NOTE
Patient presented svt vs sinus tachycardia that resolved with vagal massage.   Unclear etiology. Currently asymptomatic.  Tele bed.  Monitor BMP

## 2022-10-22 NOTE — CONSULTS
[facility]  Cardiology  Consult Note    Patient Name: Aj Marte  MRN: [id number]  Admission Date: 10/21/2022  Hospital Length of Stay: 0 days  Code Status: Full Code   Attending Provider: Cher Ellington*   Consulting Provider: Mat Higginbotham III, MD  Primary Care Physician: Alecia Mcghee MD  Principal Problem:SVT (supraventricular tachycardia)    Patient information was obtained from patient, past medical records and ER records.     Consults  Subjective:     Chief Complaint:  palpitations     HPI:   Pt is a 63 yo M w/ PMH of ADHD and HLD who presented to the ED w/ c/o palpitations.  In the ED, he was noted to have a HR in the 200s and on ECG noted to have SVT (likely AVnRT).  Vagal maneuvers were performed and aborted the episode and he has remained in sinus since.  He denies cp, sob, edema, orthopnea, PND, presyncope, current palpitations, LOC, and claudication.  He notes a h/o palpitations in the past however these have been short lived and infrequent.  He has been previously followed by a cardiologist at [facility] however has not had any abnormality diagnosed in the past.  Cardiology was consulted for further evaluation of SVT.         Past Medical History:   Diagnosis Date    ADHD (attention deficit hyperactivity disorder)     Hyperlipidemia        Past Surgical History:   Procedure Laterality Date    KNEE SURGERY      ROTATOR CUFF REPAIR      bilateral    VASECTOMY         Review of patient's allergies indicates:   Allergen Reactions    Opioids - morphine analogues Nausea And Vomiting       No current facility-administered medications on file prior to encounter.     Current Outpatient Medications on File Prior to Encounter   Medication Sig    atorvastatin (LIPITOR) 40 MG tablet Take 1 tablet (40 mg total) by mouth once daily.    ibuprofen-diphenhydrAMINE cit (ADVIL PM) 200-38 mg Tab Take 1 tablet by mouth as needed.    ketoconazole (NIZORAL) 2 % shampoo Apply topically. Apply to  scalp 2-3 times a week    MYDAYIS 25 mg CT24 Take 1 capsule by mouth once daily. (Patient taking differently: Take 25 mg by mouth once daily.)     Family History       Problem Relation (Age of Onset)    Dementia Mother    Heart disease Father, Brother    Mental illness Mother, Son          Tobacco Use    Smoking status: Never    Smokeless tobacco: Never   Substance and Sexual Activity    Alcohol use: Not Currently    Drug use: Never    Sexual activity: Yes     Review of Systems   Constitutional: Negative for diaphoresis and fever.   HENT:  Negative for congestion and hearing loss.    Eyes:  Negative for blurred vision and pain.   Cardiovascular:  Positive for palpitations. Negative for chest pain, claudication, dyspnea on exertion, leg swelling, near-syncope and syncope.   Respiratory:  Negative for shortness of breath and sleep disturbances due to breathing.    Hematologic/Lymphatic: Negative for bleeding problem. Does not bruise/bleed easily.   Skin:  Negative for color change and poor wound healing.   Gastrointestinal:  Negative for abdominal pain and nausea.   Genitourinary:  Negative for bladder incontinence and flank pain.   Neurological:  Negative for focal weakness and light-headedness.   Objective:     Vital Signs (Most Recent):  Temp: 98.1 °F (36.7 °C) (10/22/22 1139)  Pulse: 82 (10/22/22 1208)  Resp: 18 (10/22/22 1139)  BP: (!) 148/76 (10/22/22 1139)  SpO2: 95 % (10/22/22 0735)   Vital Signs (24h Range):  Temp:  [97.5 °F (36.4 °C)-98.1 °F (36.7 °C)] 98.1 °F (36.7 °C)  Pulse:  [] 82  Resp:  [16-20] 18  SpO2:  [93 %-98 %] 95 %  BP: (124-148)/(61-88) 148/76     Weight: 126.6 kg (279 lb 1.6 oz)  Body mass index is 38.93 kg/m².    SpO2: 95 %  O2 Device (Oxygen Therapy): room air      Intake/Output Summary (Last 24 hours) at 10/22/2022 1209  Last data filed at 10/22/2022 0429  Gross per 24 hour   Intake 500 ml   Output 0 ml   Net 500 ml       Lines/Drains/Airways       Peripheral Intravenous Line   Duration                  Peripheral IV - Single Lumen 10/21/22 2110 18 G Left Antecubital <1 day                    Physical Exam  Constitutional:       Appearance: He is well-developed. He is obese. He is not diaphoretic.   HENT:      Head: Normocephalic and atraumatic.   Eyes:      General: No scleral icterus.     Pupils: Pupils are equal, round, and reactive to light.   Neck:      Vascular: No JVD.   Cardiovascular:      Rate and Rhythm: Normal rate and regular rhythm.      Pulses: Intact distal pulses.      Heart sounds: S1 normal and S2 normal. No murmur heard.    No friction rub. No gallop.   Pulmonary:      Effort: Pulmonary effort is normal. No respiratory distress.      Breath sounds: Normal breath sounds. No wheezing or rales.   Chest:      Chest wall: No tenderness.   Abdominal:      General: Bowel sounds are normal. There is no distension.      Palpations: Abdomen is soft. There is no mass.      Tenderness: There is no abdominal tenderness. There is no rebound.   Musculoskeletal:         General: No tenderness. Normal range of motion.      Cervical back: Normal range of motion and neck supple.   Skin:     General: Skin is warm and dry.      Coloration: Skin is not pale.   Neurological:      Mental Status: He is alert and oriented to person, place, and time.      Coordination: Coordination normal.      Deep Tendon Reflexes: Reflexes normal.   Psychiatric:         Behavior: Behavior normal.         Judgment: Judgment normal.       Significant Labs: BMP:   Recent Labs   Lab 10/21/22  1518         K 5.0      CO2 20*   BUN 14   CREATININE 1.1   CALCIUM 9.9   MG 1.7   , CMP   Recent Labs   Lab 10/21/22  1518      K 5.0      CO2 20*      BUN 14   CREATININE 1.1   CALCIUM 9.9   PROT 8.1   ALBUMIN 4.2   BILITOT 0.5   ALKPHOS 78   AST 54*   ALT 41   ANIONGAP 14   , CBC   Recent Labs   Lab 10/21/22  1518   WBC 10.24   HGB 16.3   HCT 48.9      , INR No results for  input(s): INR, PROTIME in the last 48 hours., Lipid Panel No results for input(s): CHOL, HDL, LDLCALC, TRIG, CHOLHDL in the last 48 hours., Troponin   Recent Labs   Lab 10/21/22  1952 10/22/22  0700 10/22/22  1028   TROPONINI 0.273* 0.292* 0.218*   , and All pertinent lab results from the last 24 hours have been reviewed.    Significant Imaging: Echocardiogram: 2D echo with color flow doppler: No results found for this or any previous visit. and Transthoracic echo (TTE) complete (Cupid Only): No results found for this or any previous visit. and EKG: reviewed.     Assessment and Plan:     * SVT (supraventricular tachycardia)  Noted to have retrograde Ps and aborted w/ vagal maneuvers.  Likely AVnRT.   - start metoprolol succinate 25 mg daily   - o/p EP f/u for SVT   - pt may establish care w/ Interventional Cardiology (Dr. Higginbotham) if he desires   - f/u echo results    Class 2 obesity due to excess calories without serious comorbidity with body mass index (BMI) of 38.0 to 38.9 in adult  Encourage lifestyle modifications (diet, exercise, and weight loss).      Attention deficit hyperactivity disorder (ADHD)  May consider hold of ADHD med as may cause tachycardia.     Dyslipidemia  Resume home meds.  Risk factor and lifestyle modifications.         VTE Risk Mitigation (From admission, onward)         Ordered     heparin (porcine) injection 5,000 Units  Every 8 hours         10/21/22 1926     IP VTE HIGH RISK PATIENT  Once         10/21/22 1926     Place sequential compression device  Until discontinued         10/21/22 1926                Thank you for your consult. I will follow-up with patient. Please contact us if you have any additional questions.    Mat Higginbotham III, MD  Cardiology   [facility]

## 2022-10-22 NOTE — ASSESSMENT & PLAN NOTE
Body mass index is 38.91 kg/m². Morbid obesity complicates all aspects of disease management from diagnostic modalities to treatment. Weight loss encouraged and health benefits explained to patient.

## 2022-10-22 NOTE — ASSESSMENT & PLAN NOTE
QTC prolongation of 492.   Keep mag and K above 2 and 4 respectively.   Telemetry bed  May consider referral to cardio- physiology at discharge

## 2022-10-22 NOTE — PLAN OF CARE
Discharge orders noted. Additional clinical references attached.    Patient's discharge instructions given by bedside RN and reviewed via this VN.  Education provided on new medication, diagnosis, and follow-up appointments.  Teach back method used. Patient verbalized understanding. All questions answered. Transport to [facility] requested. Floor nurse notified.      10/22/22 6482   AVS Confirmation   Discharge instructions and AVS given to and reviewed with patient and/or significant other. Yes

## 2022-10-22 NOTE — PROGRESS NOTES
[facility]  Progress Note    Patient Name: Aj Marte  MRN: [id number]  Patient Class: OP- Observation   Admission Date: 10/21/2022  Length of Stay: 0 days  Attending Physician: Cher Ellington*  Primary Care Provider: Alecia Mcghee MD        Subjective:     Principal Problem:SVT (supraventricular tachycardia)        HPI:  61 yo male with history of ADHD and hyperlipidemia presents to ED complaining of palpitation. Patient reports he felt his heart was raising, then check his pulse which was >200 bpm.   Patient symptoms started this morning after he woke up.   He also reports feeling dehydrated. Patient denies alcohol or illicit drug use.   Patient denies chest pain, sob, headaches or numbness.     In the er, patient was found to have tachycardia with HR of 209. His rhythm appeared svt or sinus tachy on the tele monitor.   After vagal massage by ed provider, tachycardia resolved.    Patient remains asymptomatic.       Overview/Hospital Course:  No notes on file    Interval History: awake and alert, reported no palpitation since admit. Reported past his on intermittent flutter  Awaits TTE and cardiology rec's     Review of Systems   Constitutional:  Negative for activity change, appetite change, diaphoresis and fever.   Cardiovascular:  Negative for palpitations.   Genitourinary:  Negative for dysuria.   Musculoskeletal:  Negative for back pain.   Skin:  Negative for color change.   Neurological:  Negative for facial asymmetry.   Psychiatric/Behavioral:  Negative for agitation.    Objective:     Vital Signs (Most Recent):  Temp: 97.5 °F (36.4 °C) (10/22/22 0735)  Pulse: 63 (10/22/22 0735)  Resp: 18 (10/22/22 0735)  BP: 133/61 (10/22/22 0735)  SpO2: 95 % (10/22/22 0735) Vital Signs (24h Range):  Temp:  [97.5 °F (36.4 °C)-98 °F (36.7 °C)] 97.5 °F (36.4 °C)  Pulse:  [] 63  Resp:  [16-20] 18  SpO2:  [93 %-98 %] 95 %  BP: (124-138)/(61-88) 133/61     Weight: 126.6 kg (279 lb  1.6 oz)  Body mass index is 38.93 kg/m².    Intake/Output Summary (Last 24 hours) at 10/22/2022 0915  Last data filed at 10/22/2022 0429  Gross per 24 hour   Intake 500 ml   Output 0 ml   Net 500 ml      Physical Exam  Constitutional:       Appearance: Normal appearance.   HENT:      Head: Normocephalic and atraumatic.   Cardiovascular:      Rate and Rhythm: Normal rate and regular rhythm.      Heart sounds: Normal heart sounds.   Abdominal:      General: Bowel sounds are normal. There is no distension.      Palpations: Abdomen is soft.   Musculoskeletal:         General: Normal range of motion.      Cervical back: Normal range of motion and neck supple.   Skin:     General: Skin is warm and dry.   Neurological:      General: No focal deficit present.      Mental Status: He is alert and oriented to person, place, and time.   Psychiatric:         Mood and Affect: Mood normal.         Behavior: Behavior normal.       Significant Labs: A1C: No results for input(s): HGBA1C in the last 4320 hours.  ABGs: No results for input(s): PH, PCO2, HCO3, POCSATURATED, BE, TOTALHB, COHB, METHB, O2HB, POCFIO2, PO2 in the last 48 hours.  Blood Culture: No results for input(s): LABBLOO in the last 48 hours.  CBC:   Recent Labs   Lab 10/21/22  1518   WBC 10.24   HGB 16.3   HCT 48.9        CMP:   Recent Labs   Lab 10/21/22  1518      K 5.0      CO2 20*      BUN 14   CREATININE 1.1   CALCIUM 9.9   PROT 8.1   ALBUMIN 4.2   BILITOT 0.5   ALKPHOS 78   AST 54*   ALT 41   ANIONGAP 14     Lactic Acid: No results for input(s): LACTATE in the last 48 hours.  Lipase: No results for input(s): LIPASE in the last 48 hours.  Lipid Panel: No results for input(s): CHOL, HDL, LDLCALC, TRIG, CHOLHDL in the last 48 hours.  Magnesium:   Recent Labs   Lab 10/21/22  1518   MG 1.7     Troponin:   Recent Labs   Lab 10/21/22  1518 10/21/22  1952 10/22/22  0700   TROPONINI 0.074* 0.273* 0.292*     TSH:   Recent Labs   Lab 10/21/22  1518    TSH 0.498     Urine Culture: No results for input(s): LABURIN in the last 48 hours.  Urine Studies: No results for input(s): COLORU, APPEARANCEUA, PHUR, SPECGRAV, PROTEINUA, GLUCUA, KETONESU, BILIRUBINUA, OCCULTUA, NITRITE, UROBILINOGEN, LEUKOCYTESUR, RBCUA, WBCUA, BACTERIA, SQUAMEPITHEL, HYALINECASTS in the last 48 hours.    Invalid input(s): WRIGHTSUR    Significant Imaging: I have reviewed all pertinent imaging results/findings within the past 24 hours.      Assessment/Plan:      * SVT (supraventricular tachycardia)  Patient presented svt vs sinus tachycardia that resolved with vagal massage.   Unclear etiology. Currently asymptomatic.  Tele bed.  Monitor BMP      QT prolongation  QTC prolongation of 492.   Keep mag and K above 2 and 4 respectively.   Telemetry bed  May consider referral to cardio- physiology at discharge       Attention deficit hyperactivity disorder (ADHD)  Stable. Resume home dose of Mydayis      Dyslipidemia  Resume home dose of statin.         VTE Risk Mitigation (From admission, onward)         Ordered     heparin (porcine) injection 5,000 Units  Every 8 hours         10/21/22 1926     IP VTE HIGH RISK PATIENT  Once         10/21/22 1926     Place sequential compression device  Until discontinued         10/21/22 1926                Discharge Planning   ABIMAEL:      Code Status: Full Code   Is the patient medically ready for discharge?:     Reason for patient still in hospital (select all that apply): Patient trending condition                     Cher Ellington MD  Department of Hospital Medicine   [facility]

## 2022-10-22 NOTE — HPI
Pt is a 63 yo M w/ PMH of ADHD and HLD who presented to the ED w/ c/o palpitations.  In the ED, he was noted to have a HR in the 200s and on ECG noted to have SVT (likely AVnRT).  Vagal maneuvers were performed and aborted the episode and he has remained in sinus since.  He denies cp, sob, edema, orthopnea, PND, presyncope, current palpitations, LOC, and claudication.  He notes a h/o palpitations in the past however these have been short lived and infrequent.  He has been previously followed by a cardiologist at [facility] however has not had any abnormality diagnosed in the past.  Cardiology was consulted for further evaluation of SVT.

## 2022-10-22 NOTE — PROGRESS NOTES
10/21/22 9688   Admission   Initial VN Admission Questions Complete   Shift   Virtual Nurse - Patient Verbalized Approval Of Camera Use;VN Rounding   Safety/Activity   Patient Rounds bed in low position;call light in patient/parent reach;clutter free environment maintained;visualized patient;placement of personal items at bedside   Safety Promotion/Fall Prevention assistive device/personal item within reach;Fall Risk reviewed with patient/family;side rails raised x 2   Positioning   Body Position supine   Head of Bed (HOB) Positioning HOB at 30-45 degrees   Pain/Comfort/Sleep   Comfort/Acceptable Pain Level 6   VN cued in to pt's room with permission. Admission questions completed. Plan of care reviewed with pt. Pt denies any needs at this time. Call bell w/in reach. Instructed to call for needs/assist oob.

## 2022-10-22 NOTE — PLAN OF CARE
[facility]  Initial Discharge Assessment       Primary Care Provider: Alecia Mcghee MD    Admission Diagnosis: Palpitation [R00.2]  Tachycardia [R00.0]  Chest pain [R07.9]    Admission Date: 10/21/2022  Expected Discharge Date: 10/22/2022    Discharge Barriers Identified: None    Payor: BLUE CROSS BLUE SHIELD / Plan: BCBS ALL OUT OF STATE / Product Type: PPO /     Extended Emergency Contact Information  Primary Emergency Contact: Brenda Marte  Mobile Phone: [phone]  Relation: Spouse  Preferred language: English   needed? No    Discharge Plan A: Home  Discharge Plan B: Home with family      [id number] - [place], [place] - [address]  [address]  [place] [address]  Phone: [phone] Fax: [phone]    [id number] - [place], [place] - [address]  [address]  [place] [address]  Phone: [phone] Fax: [phone]    [facility] - [address] [address]  Phone: [phone] Fax: [phone]      Initial Assessment (most recent)       Adult Discharge Assessment - 10/22/22 1520          Discharge Assessment    Assessment Type Discharge Planning Assessment     Confirmed/corrected address, phone number and insurance Yes     Confirmed Demographics Correct on Facesheet     Source of Information patient;family     Does patient/caregiver understand observation status Yes     Communicated ABIMAEL with patient/caregiver Yes     Reason For Admission patient was admitted for heart Palpitations     Lives With spouse     Facility Arrived From: Patient arrived from home     Do you expect to return to your current living situation? Yes     Do you have help at home or someone to help you manage your care at home? Yes     Who are your caregiver(s) and their phone number(s)? Brenda Marte, spouse at [phone]     Prior to hospitilization cognitive status: Alert/Oriented     Current  cognitive status: Alert/Oriented     Walking or Climbing Stairs Difficulty none     Dressing/Bathing Difficulty none     Equipment Currently Used at Home none     Readmission within 30 days? No     Patient currently being followed by outpatient case management? No     Do you currently have service(s) that help you manage your care at home? No     Do you take prescription medications? Yes     Do you have prescription coverage? Yes     Coverage Blue Cross /Blue Shield     Do you have any problems affording any of your prescribed medications? No     Is the patient taking medications as prescribed? yes     Who is going to help you get home at discharge? Brenda Marte, spouse at [phone]     How do you get to doctors appointments? car, drives self     Are you on dialysis? No     Do you take coumadin? No     Discharge Plan A Home     Discharge Plan B Home with family     DME Needed Upon Discharge  none     Discharge Plan discussed with: Patient     Discharge Barriers Identified None        Physical Activity    On average, how many days per week do you engage in moderate to strenuous exercise (like a brisk walk)? 6 days     On average, how many minutes do you engage in exercise at this level? 50 min        Financial Resource Strain    How hard is it for you to pay for the very basics like food, housing, medical care, and heating? Not very hard        Housing Stability    In the last 12 months, was there a time when you were not able to pay the mortgage or rent on time? No     In the last 12 months, how many places have you lived? 1     In the last 12 months, was there a time when you did not have a steady place to sleep or slept in a shelter (including now)? Patient refused        Transportation Needs    In the past 12 months, has lack of transportation kept you from medical appointments or from getting medications? Patient refused     In the past 12 months, has lack of transportation kept you from meetings, work, or from  getting things needed for daily living? Patient refused        Food Insecurity    Within the past 12 months, you worried that your food would run out before you got the money to buy more. Never true     Within the past 12 months, the food you bought just didn't last and you didn't have money to get more. Never true        Stress    Do you feel stress - tense, restless, nervous, or anxious, or unable to sleep at night because your mind is troubled all the time - these days? To some extent        Social Connections    In a typical week, how many times do you talk on the phone with family, friends, or neighbors? More than three times a week     How often do you get together with friends or relatives? More than three times a week     How often do you attend Islam or Hinduism services? Patient refused     Do you belong to any clubs or organizations such as Islam groups, unions, fraternal or athletic groups, or school groups? Patient refused     How often do you attend meetings of the clubs or organizations you belong to? Patient refused     Are you , , , , never , or living with a partner? Living with partner        Alcohol Use    Q1: How often do you have a drink containing alcohol? Patient refused     Q2: How many drinks containing alcohol do you have on a typical day when you are drinking? Patient refused     Q3: How often do you have six or more drinks on one occasion? Daily or almost daily        Relationship/Environment    Name(s) of Who Lives With Patient Brenda Marte, spouse at [phone]

## 2022-10-22 NOTE — PLAN OF CARE
Re:  Aj Marte, WORK / SCHOOL EXCUSE    Date: 10/22/2022           [facility] [place] [facility] [address] [place] [address]  Office: [phone]  Fax: [phone]     To whom it may concern:    Mr. Aj Marte has been hospitalized at the [facility] [place] since 10/21/2022.  Please excuse the patient from duties.  Patient may return on 10/24/2022.  No restrictions.     Please contact me if you have any questions.                __________________________  Cher Ellington MD

## 2022-10-22 NOTE — DISCHARGE SUMMARY
[facility]  Discharge Summary      Patient Name: Aj Marte  MRN: [id number]  Patient Class: OP- Observation  Admission Date: 10/21/2022  Hospital Length of Stay: 0 days  Discharge Date and Time: 10/22/2022  3:45 PM  Attending Physician: Cher Ellington*   Discharging Provider: Cher Ellington MD  Primary Care Provider: Alecia Mcghee MD      HPI:   61 yo male with history of ADHD and hyperlipidemia presents to ED complaining of palpitation. Patient reports he felt his heart was raising, then check his pulse which was >200 bpm.   Patient symptoms started this morning after he woke up.   He also reports feeling dehydrated. Patient denies alcohol or illicit drug use.   Patient denies chest pain, sob, headaches or numbness.     In the er, patient was found to have tachycardia with HR of 209. His rhythm appeared svt or sinus tachy on the tele monitor.   After vagal massage by ed provider, tachycardia resolved.    Patient remains asymptomatic.       * No surgery found *      Hospital Course:   No notes on file     Goals of Care Treatment Preferences:  Code Status: Full Code      Consults:   Consults (From admission, onward)          Status Ordering Provider     Inpatient consult to [facility]  Once        Provider:  (Not yet assigned)    Acknowledged JOHNNY POSEY            * SVT (supraventricular tachycardia)  Patient presented svt vs sinus tachycardia that resolved with vagal massage.   Unclear etiology. Currently asymptomatic.  Tele bed.  Monitor BMP  Consult cardiology -appreciates rec's  - start metoprolol succinate 25 mg daily   - o/p EP f/u for SVT   - pt may establish care w/ Interventional Cardiology (Dr. Higginbotham) if     Class 2 obesity due to excess calories without serious comorbidity with body mass index (BMI) of 38.0 to 38.9 in adult  Body mass index is 38.91 kg/m². Morbid obesity complicates all aspects of disease management from diagnostic  modalities to treatment. Weight loss encouraged and health benefits explained to patient.         QT prolongation  QTC prolongation of 492.   Keep mag and K above 2 and 4 respectively.   Telemetry bed  May consider referral to cardio- physiology at discharge       Attention deficit hyperactivity disorder (ADHD)  Stable. Resume home dose of Mydayis      Dyslipidemia  Resume home dose of statin.         Final Active Diagnoses:    Diagnosis Date Noted POA    PRINCIPAL PROBLEM:  SVT (supraventricular tachycardia) [I47.1] 10/21/2022 Yes    Class 2 obesity due to excess calories without serious comorbidity with body mass index (BMI) of 38.0 to 38.9 in adult [E66.09, Z68.38] 10/22/2022 Not Applicable    Tachycardia [R00.0] 10/21/2022 Yes    QT prolongation [R94.31] 10/21/2022 Yes    Dyslipidemia [E78.5] 02/05/2020 Yes    Attention deficit hyperactivity disorder (ADHD) [F90.9] 02/05/2020 Yes      Problems Resolved During this Admission:       Discharged Condition: stable    Disposition: Home or Self Care    Follow Up:   Follow-up Information       Mat Higginbotham III, MD Follow up in 1 week(s).    Specialties: Interventional Cardiology, Interventional Cardiology  Contact information:  [address]   [id number] [place] [address]  [phone]                           Patient Instructions:      Ambulatory referral/consult to Cardiac Electrophysiology   Standing Status: Future   Referral Priority: Routine Referral Type: Consultation   Referral Reason: Specialty Services Required   Requested Specialty: Cardiology   Number of Visits Requested: 1     Diet Cardiac     Activity as tolerated         Pending Diagnostic Studies:       Procedure Component Value Units Date/Time    Echo [[id number]]  (Abnormal) Resulted: 10/22/22 1349    Order Status: Sent Lab Status: In process Updated: 10/22/22 1351     BSA 2.52 m2      TDI SEPTAL 0.07 m/s      LV LATERAL E/E' RATIO 8.89 m/s      LV SEPTAL E/E' RATIO 11.43 m/s      IVC  "diameter 2.10 cm      Left Ventricular Outflow Tract Mean Velocity 0.50 cm/s      Left Ventricular Outflow Tract Mean Gradient 1.05 mmHg      TDI LATERAL 0.09 m/s      LVIDd 5.41 cm      IVS 1.20 cm      Posterior Wall 1.24 cm      Ao root annulus 3.73 cm      LVIDs 3.74 cm      FS 31 %      LV mass 271.33 g      LA size 2.84 cm      RV S' 0.01 cm/s      Left Ventricle Relative Wall Thickness 0.46 cm      AV mean gradient 4 mmHg      AV valve area 1.92 cm2      AV Velocity Ratio 0.52     AV index (prosthetic) 0.49     MV mean gradient 1 mmHg      MV valve area p 1/2 method 3.72 cm2      MV valve area by continuity eq 2.46 cm2      E/A ratio 1.19     Mean e' 0.08 m/s      E wave deceleration time 194.11 msec      IVRT 77.07 msec      MV "A" wave duration [id number] msec      Pulm vein S/D ratio 1.52     LVOT diameter 2.24 cm      LVOT area 3.9 cm2      LVOT peak jin 0.63 m/s      LVOT peak VTI 13.10 cm      Ao peak jin 1.22 m/s      Ao VTI 26.9 cm      LVOT stroke volume 51.60 cm3      AV peak gradient 6 mmHg      MV peak gradient 3 mmHg      E/E' ratio 10.00 m/s      MV Peak E Jin 0.80 m/s      TR Max Jin 1.90 m/s      MV VTI 21.0 cm      MV stenosis pressure 1/2 time 59.12 ms      MV Peak A Jin 0.67 m/s      PV Peak S Jin 0.35 m/s      PV Peak D Jin 0.23 m/s      LV Systolic Volume 59.61 mL      LV Systolic Volume Index 24.5 mL/m2      LV Diastolic Volume 141.67 mL      LV Diastolic Volume Index 58.30 mL/m2      LV Mass Index 112 g/m2      RA Major Axis 5.41 cm      Left Atrium Minor Axis 5.06 cm      Left Atrium Major Axis 5.56 cm      Triscuspid Valve Regurgitation Peak Gradient 14 mmHg      LA Volume Index (Mod) 18.8 mL/m2      LA volume (mod) 45.74 cm3     Narrative:      · Concentric remodeling and       Impression:                 Medications:  Reconciled Home Medications:      Medication List        START taking these medications      metoprolol succinate 25 MG 24 hr tablet  Commonly known as: " TOPROL-XL  Take 1 tablet (25 mg total) by mouth once daily.  Start taking on: October 23, 2022            CHANGE how you take these medications      MYDAYIS 25 mg Ct24  Generic drug: dextroamphetamine-amphetamine  Take 1 capsule by mouth once daily.  What changed: how much to take            CONTINUE taking these medications      ADVIL -38 mg Tab  Generic drug: ibuprofen-diphenhydrAMINE cit  Take 1 tablet by mouth as needed.     atorvastatin 40 MG tablet  Commonly known as: LIPITOR  Take 1 tablet (40 mg total) by mouth once daily.     ketoconazole 2 % shampoo  Commonly known as: NIZORAL  Apply topically. Apply to scalp 2-3 times a week              Indwelling Lines/Drains at time of discharge:   Lines/Drains/Airways       None                   Time spent on the discharge of patient: 35 minutes         Cher Ellington MD  Department of Hospital Medicine  [facility]

## 2022-10-22 NOTE — SUBJECTIVE & OBJECTIVE
Interval History: awake and alert, reported no palpitation since admit. Reported past his on intermittent flutter  Awaits TTE and cardiology rec's     Review of Systems   Constitutional:  Negative for activity change, appetite change, diaphoresis and fever.   Cardiovascular:  Negative for palpitations.   Genitourinary:  Negative for dysuria.   Musculoskeletal:  Negative for back pain.   Skin:  Negative for color change.   Neurological:  Negative for facial asymmetry.   Psychiatric/Behavioral:  Negative for agitation.    Objective:     Vital Signs (Most Recent):  Temp: 97.5 °F (36.4 °C) (10/22/22 0735)  Pulse: 63 (10/22/22 0735)  Resp: 18 (10/22/22 0735)  BP: 133/61 (10/22/22 0735)  SpO2: 95 % (10/22/22 0735) Vital Signs (24h Range):  Temp:  [97.5 °F (36.4 °C)-98 °F (36.7 °C)] 97.5 °F (36.4 °C)  Pulse:  [] 63  Resp:  [16-20] 18  SpO2:  [93 %-98 %] 95 %  BP: (124-138)/(61-88) 133/61     Weight: 126.6 kg (279 lb 1.6 oz)  Body mass index is 38.93 kg/m².    Intake/Output Summary (Last 24 hours) at 10/22/2022 0915  Last data filed at 10/22/2022 0429  Gross per 24 hour   Intake 500 ml   Output 0 ml   Net 500 ml      Physical Exam  Constitutional:       Appearance: Normal appearance.   HENT:      Head: Normocephalic and atraumatic.   Cardiovascular:      Rate and Rhythm: Normal rate and regular rhythm.      Heart sounds: Normal heart sounds.   Abdominal:      General: Bowel sounds are normal. There is no distension.      Palpations: Abdomen is soft.   Musculoskeletal:         General: Normal range of motion.      Cervical back: Normal range of motion and neck supple.   Skin:     General: Skin is warm and dry.   Neurological:      General: No focal deficit present.      Mental Status: He is alert and oriented to person, place, and time.   Psychiatric:         Mood and Affect: Mood normal.         Behavior: Behavior normal.       Significant Labs: A1C: No results for input(s): HGBA1C in the last 4320 hours.  ABGs: No  results for input(s): PH, PCO2, HCO3, POCSATURATED, BE, TOTALHB, COHB, METHB, O2HB, POCFIO2, PO2 in the last 48 hours.  Blood Culture: No results for input(s): LABBLOO in the last 48 hours.  CBC:   Recent Labs   Lab 10/21/22  1518   WBC 10.24   HGB 16.3   HCT 48.9        CMP:   Recent Labs   Lab 10/21/22  1518      K 5.0      CO2 20*      BUN 14   CREATININE 1.1   CALCIUM 9.9   PROT 8.1   ALBUMIN 4.2   BILITOT 0.5   ALKPHOS 78   AST 54*   ALT 41   ANIONGAP 14     Lactic Acid: No results for input(s): LACTATE in the last 48 hours.  Lipase: No results for input(s): LIPASE in the last 48 hours.  Lipid Panel: No results for input(s): CHOL, HDL, LDLCALC, TRIG, CHOLHDL in the last 48 hours.  Magnesium:   Recent Labs   Lab 10/21/22  1518   MG 1.7     Troponin:   Recent Labs   Lab 10/21/22  1518 10/21/22  1952 10/22/22  0700   TROPONINI 0.074* 0.273* 0.292*     TSH:   Recent Labs   Lab 10/21/22  1518   TSH 0.498     Urine Culture: No results for input(s): LABURIN in the last 48 hours.  Urine Studies: No results for input(s): COLORU, APPEARANCEUA, PHUR, SPECGRAV, PROTEINUA, GLUCUA, KETONESU, BILIRUBINUA, OCCULTUA, NITRITE, UROBILINOGEN, LEUKOCYTESUR, RBCUA, WBCUA, BACTERIA, SQUAMEPITHEL, HYALINECASTS in the last 48 hours.    Invalid input(s): LENARD    Significant Imaging: I have reviewed all pertinent imaging results/findings within the past 24 hours.

## 2022-10-22 NOTE — ASSESSMENT & PLAN NOTE
Patient presented svt vs sinus tachycardia that resolved with vagal massage.   Unclear etiology. Currently asymptomatic.  Tele bed.  Monitor BMP  Consult cardiology -appreciates rec's  - start metoprolol succinate 25 mg daily   - o/p EP f/u for SVT   - pt may establish care w/ Interventional Cardiology (Dr. Higginbotham) if

## 2022-10-22 NOTE — SUBJECTIVE & OBJECTIVE
Past Medical History:   Diagnosis Date    ADHD (attention deficit hyperactivity disorder)     Hyperlipidemia        Past Surgical History:   Procedure Laterality Date    KNEE SURGERY      ROTATOR CUFF REPAIR      bilateral    VASECTOMY         Review of patient's allergies indicates:   Allergen Reactions    Opioids - morphine analogues Nausea And Vomiting       No current facility-administered medications on file prior to encounter.     Current Outpatient Medications on File Prior to Encounter   Medication Sig    atorvastatin (LIPITOR) 40 MG tablet Take 1 tablet (40 mg total) by mouth once daily.    ibuprofen-diphenhydrAMINE cit (ADVIL PM) 200-38 mg Tab Take 1 tablet by mouth as needed.    ketoconazole (NIZORAL) 2 % shampoo Apply topically. Apply to scalp 2-3 times a week    MYDAYIS 25 mg CT24 Take 1 capsule by mouth once daily. (Patient taking differently: Take 25 mg by mouth once daily.)     Family History       Problem Relation (Age of Onset)    Dementia Mother    Heart disease Father, Brother    Mental illness Mother, Son          Tobacco Use    Smoking status: Never    Smokeless tobacco: Never   Substance and Sexual Activity    Alcohol use: Not Currently    Drug use: Never    Sexual activity: Yes     Review of Systems   Constitutional: Negative for diaphoresis and fever.   HENT:  Negative for congestion and hearing loss.    Eyes:  Negative for blurred vision and pain.   Cardiovascular:  Positive for palpitations. Negative for chest pain, claudication, dyspnea on exertion, leg swelling, near-syncope and syncope.   Respiratory:  Negative for shortness of breath and sleep disturbances due to breathing.    Hematologic/Lymphatic: Negative for bleeding problem. Does not bruise/bleed easily.   Skin:  Negative for color change and poor wound healing.   Gastrointestinal:  Negative for abdominal pain and nausea.   Genitourinary:  Negative for bladder incontinence and flank pain.   Neurological:  Negative for focal  weakness and light-headedness.   Objective:     Vital Signs (Most Recent):  Temp: 98.1 °F (36.7 °C) (10/22/22 1139)  Pulse: 82 (10/22/22 1208)  Resp: 18 (10/22/22 1139)  BP: (!) 148/76 (10/22/22 1139)  SpO2: 95 % (10/22/22 0735)   Vital Signs (24h Range):  Temp:  [97.5 °F (36.4 °C)-98.1 °F (36.7 °C)] 98.1 °F (36.7 °C)  Pulse:  [] 82  Resp:  [16-20] 18  SpO2:  [93 %-98 %] 95 %  BP: (124-148)/(61-88) 148/76     Weight: 126.6 kg (279 lb 1.6 oz)  Body mass index is 38.93 kg/m².    SpO2: 95 %  O2 Device (Oxygen Therapy): room air      Intake/Output Summary (Last 24 hours) at 10/22/2022 1209  Last data filed at 10/22/2022 0429  Gross per 24 hour   Intake 500 ml   Output 0 ml   Net 500 ml       Lines/Drains/Airways       Peripheral Intravenous Line  Duration                  Peripheral IV - Single Lumen 10/21/22 2110 18 G Left Antecubital <1 day                    Physical Exam  Constitutional:       Appearance: He is well-developed. He is obese. He is not diaphoretic.   HENT:      Head: Normocephalic and atraumatic.   Eyes:      General: No scleral icterus.     Pupils: Pupils are equal, round, and reactive to light.   Neck:      Vascular: No JVD.   Cardiovascular:      Rate and Rhythm: Normal rate and regular rhythm.      Pulses: Intact distal pulses.      Heart sounds: S1 normal and S2 normal. No murmur heard.    No friction rub. No gallop.   Pulmonary:      Effort: Pulmonary effort is normal. No respiratory distress.      Breath sounds: Normal breath sounds. No wheezing or rales.   Chest:      Chest wall: No tenderness.   Abdominal:      General: Bowel sounds are normal. There is no distension.      Palpations: Abdomen is soft. There is no mass.      Tenderness: There is no abdominal tenderness. There is no rebound.   Musculoskeletal:         General: No tenderness. Normal range of motion.      Cervical back: Normal range of motion and neck supple.   Skin:     General: Skin is warm and dry.      Coloration: Skin  is not pale.   Neurological:      Mental Status: He is alert and oriented to person, place, and time.      Coordination: Coordination normal.      Deep Tendon Reflexes: Reflexes normal.   Psychiatric:         Behavior: Behavior normal.         Judgment: Judgment normal.       Significant Labs: BMP:   Recent Labs   Lab 10/21/22  1518         K 5.0      CO2 20*   BUN 14   CREATININE 1.1   CALCIUM 9.9   MG 1.7   , CMP   Recent Labs   Lab 10/21/22  1518      K 5.0      CO2 20*      BUN 14   CREATININE 1.1   CALCIUM 9.9   PROT 8.1   ALBUMIN 4.2   BILITOT 0.5   ALKPHOS 78   AST 54*   ALT 41   ANIONGAP 14   , CBC   Recent Labs   Lab 10/21/22  1518   WBC 10.24   HGB 16.3   HCT 48.9      , INR No results for input(s): INR, PROTIME in the last 48 hours., Lipid Panel No results for input(s): CHOL, HDL, LDLCALC, TRIG, CHOLHDL in the last 48 hours., Troponin   Recent Labs   Lab 10/21/22  1952 10/22/22  0700 10/22/22  1028   TROPONINI 0.273* 0.292* 0.218*   , and All pertinent lab results from the last 24 hours have been reviewed.    Significant Imaging: Echocardiogram: 2D echo with color flow doppler: No results found for this or any previous visit. and Transthoracic echo (TTE) complete (Cupid Only): No results found for this or any previous visit. and EKG: reviewed.

## 2022-10-22 NOTE — H&P
[facility] Emergency [facility] Medicine  History & Physical    Patient Name: Aj Marte  MRN: [id number]  Patient Class: OP- Observation  Admission Date: 10/21/2022  Attending Physician: Jay Manley MD   Primary Care Provider: Alecia Mcghee MD         Patient information was obtained from patient and ER records.     Subjective:     Principal Problem:SVT (supraventricular tachycardia)    Chief Complaint:   Chief Complaint   Patient presents with    Palpitations        HPI: 61 yo male with history of ADHD and hyperlipidemia presents to ED complaining of palpitation. Patient reports he felt his heart was raising, then check his pulse which was >200 bpm.   Patient symptoms started this morning after he woke up.   He also reports feeling dehydrated. Patient denies alcohol or illicit drug use.   Patient denies chest pain, sob, headaches or numbness.     In the er, patient was found to have tachycardia with HR of 209. His rhythm appeared svt or sinus tachy on the tele monitor.   After vagal massage by ed provider, tachycardia resolved.    Patient remains asymptomatic.       Past Medical History:   Diagnosis Date    ADHD (attention deficit hyperactivity disorder)     Hyperlipidemia        Past Surgical History:   Procedure Laterality Date    KNEE SURGERY      ROTATOR CUFF REPAIR      bilateral    VASECTOMY         Review of patient's allergies indicates:   Allergen Reactions    Opioids - morphine analogues Nausea And Vomiting       No current facility-administered medications on file prior to encounter.     Current Outpatient Medications on File Prior to Encounter   Medication Sig    atorvastatin (LIPITOR) 40 MG tablet Take 1 tablet (40 mg total) by mouth once daily.    ibuprofen-diphenhydrAMINE cit (ADVIL PM) 200-38 mg Tab Take 1 tablet by mouth as needed.    ketoconazole (NIZORAL) 2 % shampoo Apply topically. Apply to scalp 2-3 times a week    MYDAYIS 25 mg CT24 Take 1 capsule by mouth once daily. (Patient  taking differently: Take 25 mg by mouth once daily.)    [DISCONTINUED] clobetasoL (TEMOVATE) 0.05 % cream PLEASE SEE ATTACHED FOR DETAILED DIRECTIONS     Family History       Problem Relation (Age of Onset)    Dementia Mother    Heart disease Father, Brother    Mental illness Mother, Son          Tobacco Use    Smoking status: Never    Smokeless tobacco: Never   Substance and Sexual Activity    Alcohol use: Not Currently    Drug use: Never    Sexual activity: Yes     Review of Systems   Constitutional:  Negative for activity change, appetite change, diaphoresis and fever.   HENT: Negative.     Eyes: Negative.    Respiratory: Negative.     Cardiovascular:  Positive for palpitations.   Endocrine: Negative.    Genitourinary: Negative.    Musculoskeletal: Negative.    Skin:  Negative for color change.   Allergic/Immunologic: Negative.    Hematological: Negative.    Psychiatric/Behavioral: Negative.     Objective:     Vital Signs (Most Recent):  Temp: 98 °F (36.7 °C) (10/21/22 1449)  Pulse: 101 (10/21/22 1720)  Resp: 16 (10/21/22 1515)  BP: 124/70 (10/21/22 1738)  SpO2: 97 % (10/21/22 1719) Vital Signs (24h Range):  Temp:  [98 °F (36.7 °C)] 98 °F (36.7 °C)  Pulse:  [] 101  Resp:  [16-20] 16  SpO2:  [95 %-98 %] 97 %  BP: (124-138)/(70-84) 124/70        There is no height or weight on file to calculate BMI.    Physical Exam  Constitutional:       Appearance: Normal appearance.   HENT:      Head: Normocephalic and atraumatic.      Right Ear: External ear normal.      Left Ear: External ear normal.      Nose: Nose normal.      Mouth/Throat:      Mouth: Mucous membranes are moist.      Pharynx: Oropharynx is clear.   Eyes:      Pupils: Pupils are equal, round, and reactive to light.   Cardiovascular:      Rate and Rhythm: Normal rate and regular rhythm.      Heart sounds: Normal heart sounds.   Abdominal:      General: Bowel sounds are normal. There is no distension.      Palpations: Abdomen is soft.   Musculoskeletal:          General: Normal range of motion.      Cervical back: Normal range of motion and neck supple.   Skin:     General: Skin is warm and dry.   Neurological:      General: No focal deficit present.      Mental Status: He is alert and oriented to person, place, and time.   Psychiatric:         Mood and Affect: Mood normal.         Behavior: Behavior normal.       Initial EKG on 10/21/22 : SVT with , rbbb, criteria for inferior infarct age undetermined.      2ND EKG on 11/21/22: sinus tachycardia with hr 110, rbbb, non specific T wave abnormality no longer present. Prolong qtc 492.      Significant Labs: All pertinent labs within the past 24 hours have been reviewed.    Significant Imaging: I have reviewed all pertinent imaging results/findings within the past 24 hours.    Assessment/Plan:     * SVT (supraventricular tachycardia)  Patient presented svt vs sinus tachycardia that resolved with vagal massage.   Unclear etiology. Currently asymptomatic.  Tele bed. Echo in am.  Recheck bmp in am      QT prolongation  QTC prolongation of 492.   Keep mag and K above 2 and 4 respectively.   Telemetry bed  May consider referral to cardio- electrophysiology      Attention deficit hyperactivity disorder (ADHD)  Stable. Resume home dose of Mydayis      Dyslipidemia  Resume home dose of statin.         VTE prophylaxis : Heparin sc  Code status: full code    Observation status due to anticipated length of stay <2 over nights.        Jay Manley MD  Department of Hospital Medicine   [place] - Emergency Dept

## 2022-10-23 ENCOUNTER — PATIENT MESSAGE (OUTPATIENT)
Dept: INTERNAL MEDICINE | Facility: CLINIC | Age: 62
End: 2022-10-23
Payer: COMMERCIAL

## 2022-10-23 LAB
AORTIC ROOT ANNULUS: 3.73 CM
AV INDEX (PROSTH): 0.49
AV MEAN GRADIENT: 4 MMHG
AV PEAK GRADIENT: 6 MMHG
AV VALVE AREA: 1.92 CM2
AV VELOCITY RATIO: 0.52
BSA FOR ECHO PROCEDURE: 2.52 M2
CV ECHO LV RWT: 0.46 CM
DOP CALC AO PEAK VEL: 1.22 M/S
DOP CALC AO VTI: 26.9 CM
DOP CALC LVOT AREA: 3.9 CM2
DOP CALC LVOT DIAMETER: 2.24 CM
DOP CALC LVOT PEAK VEL: 0.63 M/S
DOP CALC LVOT STROKE VOLUME: 51.6 CM3
DOP CALC MV VTI: 21 CM
DOP CALCLVOT PEAK VEL VTI: 13.1 CM
E WAVE DECELERATION TIME: 194.11 MSEC
E/A RATIO: 1.19
E/E' RATIO: 10 M/S
ECHO LV POSTERIOR WALL: 1.24 CM (ref 0.6–1.1)
EJECTION FRACTION: 55 %
FRACTIONAL SHORTENING: 31 % (ref 28–44)
INTERVENTRICULAR SEPTUM: 1.2 CM (ref 0.6–1.1)
IVC DIAMETER: 2.1 CM
IVRT: 77.07 MSEC
LA MAJOR: 5.56 CM
LA MINOR: 5.06 CM
LEFT ATRIUM SIZE: 2.84 CM
LEFT ATRIUM VOLUME INDEX MOD: 18.8 ML/M2
LEFT ATRIUM VOLUME MOD: 45.74 CM3
LEFT INTERNAL DIMENSION IN SYSTOLE: 3.74 CM (ref 2.1–4)
LEFT VENTRICLE DIASTOLIC VOLUME INDEX: 58.3 ML/M2
LEFT VENTRICLE DIASTOLIC VOLUME: 141.67 ML
LEFT VENTRICLE MASS INDEX: 112 G/M2
LEFT VENTRICLE SYSTOLIC VOLUME INDEX: 24.5 ML/M2
LEFT VENTRICLE SYSTOLIC VOLUME: 59.61 ML
LEFT VENTRICULAR INTERNAL DIMENSION IN DIASTOLE: 5.41 CM (ref 3.5–6)
LEFT VENTRICULAR MASS: 271.33 G
LV LATERAL E/E' RATIO: 8.89 M/S
LV SEPTAL E/E' RATIO: 11.43 M/S
LVOT MG: 1.05 MMHG
LVOT MV: 0.5 CM/S
MV A" WAVE DURATION": 162.7 MSEC
MV MEAN GRADIENT: 1 MMHG
MV PEAK A VEL: 0.67 M/S
MV PEAK E VEL: 0.8 M/S
MV PEAK GRADIENT: 3 MMHG
MV STENOSIS PRESSURE HALF TIME: 59.12 MS
MV VALVE AREA BY CONTINUITY EQUATION: 2.46 CM2
MV VALVE AREA P 1/2 METHOD: 3.72 CM2
PISA TR MAX VEL: 1.9 M/S
PULM VEIN S/D RATIO: 1.52
PV PEAK D VEL: 0.23 M/S
PV PEAK S VEL: 0.35 M/S
RA MAJOR: 5.41 CM
RA PRESSURE: 8 MMHG
RV TISSUE DOPPLER FREE WALL SYSTOLIC VELOCITY 1 (APICAL 4 CHAMBER VIEW): 0.01 CM/S
TDI LATERAL: 0.09 M/S
TDI SEPTAL: 0.07 M/S
TDI: 0.08 M/S
TR MAX PG: 14 MMHG
TV REST PULMONARY ARTERY PRESSURE: 22 MMHG

## 2022-11-03 ENCOUNTER — OFFICE VISIT (OUTPATIENT)
Dept: CARDIOLOGY | Facility: CLINIC | Age: 62
End: 2022-11-03
Payer: COMMERCIAL

## 2022-11-03 VITALS
BODY MASS INDEX: 40.04 KG/M2 | DIASTOLIC BLOOD PRESSURE: 82 MMHG | SYSTOLIC BLOOD PRESSURE: 132 MMHG | HEART RATE: 65 BPM | WEIGHT: 286 LBS | HEIGHT: 71 IN | OXYGEN SATURATION: 95 %

## 2022-11-03 DIAGNOSIS — I47.10 SVT (SUPRAVENTRICULAR TACHYCARDIA): ICD-10-CM

## 2022-11-03 DIAGNOSIS — F90.9 ATTENTION DEFICIT HYPERACTIVITY DISORDER (ADHD), UNSPECIFIED ADHD TYPE: ICD-10-CM

## 2022-11-03 DIAGNOSIS — E66.09 CLASS 2 OBESITY DUE TO EXCESS CALORIES WITHOUT SERIOUS COMORBIDITY WITH BODY MASS INDEX (BMI) OF 39.0 TO 39.9 IN ADULT: ICD-10-CM

## 2022-11-03 DIAGNOSIS — G47.33 OSA ON CPAP: ICD-10-CM

## 2022-11-03 DIAGNOSIS — E78.5 DYSLIPIDEMIA: ICD-10-CM

## 2022-11-03 PROCEDURE — 99999 PR PBB SHADOW E&M-EST. PATIENT-LVL V: CPT | Mod: PBBFAC,,, | Performed by: INTERNAL MEDICINE

## 2022-11-03 PROCEDURE — 1159F PR MEDICATION LIST DOCUMENTED IN MEDICAL RECORD: ICD-10-PCS | Mod: CPTII,S$GLB,, | Performed by: INTERNAL MEDICINE

## 2022-11-03 PROCEDURE — 3079F DIAST BP 80-89 MM HG: CPT | Mod: CPTII,S$GLB,, | Performed by: INTERNAL MEDICINE

## 2022-11-03 PROCEDURE — 1160F PR REVIEW ALL MEDS BY PRESCRIBER/CLIN PHARMACIST DOCUMENTED: ICD-10-PCS | Mod: CPTII,S$GLB,, | Performed by: INTERNAL MEDICINE

## 2022-11-03 PROCEDURE — 99999 PR PBB SHADOW E&M-EST. PATIENT-LVL V: ICD-10-PCS | Mod: PBBFAC,,, | Performed by: INTERNAL MEDICINE

## 2022-11-03 PROCEDURE — 3075F SYST BP GE 130 - 139MM HG: CPT | Mod: CPTII,S$GLB,, | Performed by: INTERNAL MEDICINE

## 2022-11-03 PROCEDURE — 3079F PR MOST RECENT DIASTOLIC BLOOD PRESSURE 80-89 MM HG: ICD-10-PCS | Mod: CPTII,S$GLB,, | Performed by: INTERNAL MEDICINE

## 2022-11-03 PROCEDURE — 99214 OFFICE O/P EST MOD 30 MIN: CPT | Mod: S$GLB,,, | Performed by: INTERNAL MEDICINE

## 2022-11-03 PROCEDURE — 3008F BODY MASS INDEX DOCD: CPT | Mod: CPTII,S$GLB,, | Performed by: INTERNAL MEDICINE

## 2022-11-03 PROCEDURE — 99214 PR OFFICE/OUTPT VISIT, EST, LEVL IV, 30-39 MIN: ICD-10-PCS | Mod: S$GLB,,, | Performed by: INTERNAL MEDICINE

## 2022-11-03 PROCEDURE — 1160F RVW MEDS BY RX/DR IN RCRD: CPT | Mod: CPTII,S$GLB,, | Performed by: INTERNAL MEDICINE

## 2022-11-03 PROCEDURE — 3008F PR BODY MASS INDEX (BMI) DOCUMENTED: ICD-10-PCS | Mod: CPTII,S$GLB,, | Performed by: INTERNAL MEDICINE

## 2022-11-03 PROCEDURE — 3075F PR MOST RECENT SYSTOLIC BLOOD PRESS GE 130-139MM HG: ICD-10-PCS | Mod: CPTII,S$GLB,, | Performed by: INTERNAL MEDICINE

## 2022-11-03 PROCEDURE — 1159F MED LIST DOCD IN RCRD: CPT | Mod: CPTII,S$GLB,, | Performed by: INTERNAL MEDICINE

## 2022-11-03 NOTE — ASSESSMENT & PLAN NOTE
Encouraged lifestyle modifications (diet, exercise, and weight loss).    - bariatric medicine consult

## 2022-11-03 NOTE — PROGRESS NOTES
Subjective:    Patient ID:  Aj Marte is a 62 y.o. male who presents for follow-up of Hospital Follow Up and Tachycardia      PCP: Alecia Mcghee MD     HPI  Pt is a 63 yo M w/ PMH of ADHD, ALEENA on CPAP, and HLD who presents for evaluation s/p hospitalization.  He was admitted 10/21/2022-10/22/2022 for palpitations and was noted to have SVT (AVnRT) which resolved w/ vagal maneuvers and was evaluated by cardiology (myself) and a recommendation was made to start on metoprolol and refer to EP.  He was started on metoprolol however was not referred to EP.  He mentions that he has been doing well.  He denies palpitations, cp, sob, edema, orthopnea, PND, presyncope, LOC, and claudication.  He notes compliance w/ meds and denies side effects.  He does not monitor his BP at home regularly.  He does not exercise and states that he has some ankle pain which limits his activity.        Past Medical History:   Diagnosis Date    ADHD (attention deficit hyperactivity disorder)     Hyperlipidemia      Past Surgical History:   Procedure Laterality Date    KNEE SURGERY      ROTATOR CUFF REPAIR      bilateral    VASECTOMY       Social History     Socioeconomic History    Marital status:    Tobacco Use    Smoking status: Never    Smokeless tobacco: Never   Substance and Sexual Activity    Alcohol use: Not Currently    Drug use: Never    Sexual activity: Yes     Social Determinants of Health     Financial Resource Strain: Low Risk     Difficulty of Paying Living Expenses: Not very hard   Food Insecurity: No Food Insecurity    Worried About Running Out of Food in the Last Year: Never true    Ran Out of Food in the Last Year: Never true   Transportation Needs: Unknown    Lack of Transportation (Medical): Patient refused    Lack of Transportation (Non-Medical): Patient refused   Physical Activity: Sufficiently Active    Days of Exercise per Week: 6 days    Minutes of Exercise per Session: 50 min   Stress: Stress Concern  Present    Feeling of Stress : To some extent   Social Connections: Unknown    Frequency of Communication with Friends and Family: More than three times a week    Frequency of Social Gatherings with Friends and Family: More than three times a week    Attends Sikhism Services: Patient refused    Active Member of Clubs or Organizations: Patient refused    Attends Club or Organization Meetings: Patient refused    Marital Status: Living with partner   Housing Stability: Unknown    Unable to Pay for Housing in the Last Year: No    Number of Places Lived in the Last Year: 1    Unstable Housing in the Last Year: Patient refused     Family History   Problem Relation Age of Onset    Dementia Mother     Mental illness Mother         dementia    Heart disease Father         CAD  at age 39    Mental illness Son         anxiety ADHD    Heart disease Brother         CAD age  38       Review of patient's allergies indicates:   Allergen Reactions    Opioids - morphine analogues Nausea And Vomiting       Medication List with Changes/Refills   Current Medications    ATORVASTATIN (LIPITOR) 40 MG TABLET    Take 1 tablet (40 mg total) by mouth once daily.    IBUPROFEN-DIPHENHYDRAMINE CIT (ADVIL PM) 200-38 MG TAB    Take 1 tablet by mouth as needed.    KETOCONAZOLE (NIZORAL) 2 % SHAMPOO    Apply topically. Apply to scalp 2-3 times a week    METOPROLOL SUCCINATE (TOPROL-XL) 25 MG 24 HR TABLET    Take 1 tablet (25 mg total) by mouth once daily.    MYDAYIS 25 MG CT24    Take 1 capsule by mouth once daily.       Review of Systems   Constitutional: Negative for diaphoresis and fever.   HENT:  Negative for congestion and hearing loss.    Eyes:  Negative for blurred vision and pain.   Cardiovascular:  Negative for chest pain, claudication, dyspnea on exertion, leg swelling, near-syncope, palpitations and syncope.   Respiratory:  Negative for shortness of breath and sleep disturbances due to breathing.    Hematologic/Lymphatic:  "Negative for bleeding problem. Does not bruise/bleed easily.   Skin:  Negative for color change and poor wound healing.   Gastrointestinal:  Negative for abdominal pain and nausea.   Genitourinary:  Negative for bladder incontinence and flank pain.   Neurological:  Negative for focal weakness and light-headedness.      Objective:   /82   Pulse 65   Ht 5' 11" (1.803 m)   Wt 129.7 kg (286 lb)   SpO2 95%   BMI 39.89 kg/m²    Physical Exam  Constitutional:       Appearance: He is well-developed. He is obese. He is not diaphoretic.   HENT:      Head: Normocephalic and atraumatic.   Eyes:      General: No scleral icterus.     Pupils: Pupils are equal, round, and reactive to light.   Neck:      Vascular: No JVD.   Cardiovascular:      Rate and Rhythm: Normal rate and regular rhythm.      Pulses: Intact distal pulses.      Heart sounds: S1 normal and S2 normal. No murmur heard.    No friction rub. No gallop.   Pulmonary:      Effort: Pulmonary effort is normal. No respiratory distress.      Breath sounds: Normal breath sounds. No wheezing or rales.   Chest:      Chest wall: No tenderness.   Abdominal:      General: Bowel sounds are normal. There is no distension.      Palpations: Abdomen is soft. There is no mass.      Tenderness: There is no abdominal tenderness. There is no rebound.   Musculoskeletal:         General: No tenderness. Normal range of motion.      Cervical back: Normal range of motion and neck supple.   Skin:     General: Skin is warm and dry.      Coloration: Skin is not pale.   Neurological:      Mental Status: He is alert and oriented to person, place, and time.      Coordination: Coordination normal.      Deep Tendon Reflexes: Reflexes normal.   Psychiatric:         Behavior: Behavior normal.         Judgment: Judgment normal.         ECG: 10/21/2022- reviewed.  SVT (AVnRT), RBBB, ?inferior infarct.      Echo: 10/22/2022- reviewed.    Summary    The left ventricle is normal in size with " concentric remodeling and normal systolic function.  The estimated ejection fraction is 55%.  Normal left ventricular diastolic function.  Mild right ventricular enlargement with normal right ventricular systolic function.  Mild to moderate left atrial enlargement.  Mild right atrial enlargement.  Intermediate central venous pressure (8 mmHg).  The estimated PA systolic pressure is 22 mmHg.    Assessment:       1. SVT (supraventricular tachycardia)    2. Class 2 obesity due to excess calories without serious comorbidity with body mass index (BMI) of 39.0 to 39.9 in adult    3. Attention deficit hyperactivity disorder (ADHD), unspecified ADHD type    4. Dyslipidemia    5. ALEENA on CPAP         Plan:         Class 2 obesity due to excess calories without serious comorbidity with body mass index (BMI) of 39.0 to 39.9 in adult  Encouraged lifestyle modifications (diet, exercise, and weight loss).    - bariatric medicine consult     Attention deficit hyperactivity disorder (ADHD)  Management per PCP.      Dyslipidemia  Controlled.  Goal LDL < 130, last LDL 82 4/2022.  Compliant w/ statin.    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  Pt denies further episodes of palpitations and has been compliant w/ metoprolol.    - refer to EP for further eval and management   - continue metoprolol for now   - risk factor and lifestyle modifications      ALEENA on CPAP  Compliant w/ CPAP.        Total duration of face to face visit time 30 minutes.  Total time spent counseling greater than fifty percent of total visit time.  Counseling included discussion regarding imaging findings, diagnosis, possibilities, treatment options, risks and benefits.  The patient had many questions regarding the options and long-term effects      Mat Higginbotham M.D.  Interventional Cardiology

## 2022-11-03 NOTE — ASSESSMENT & PLAN NOTE
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  Pt denies further episodes of palpitations and has been compliant w/ metoprolol.    - refer to EP for further eval and management   - continue metoprolol for now   - risk factor and lifestyle modifications

## 2022-11-03 NOTE — ASSESSMENT & PLAN NOTE
Controlled.  Goal LDL < 130, last LDL 82 4/2022.  Compliant w/ statin.    - continue medical therapy   - risk factor and lifestyle modifications

## 2022-11-30 ENCOUNTER — TELEPHONE (OUTPATIENT)
Dept: CARDIOLOGY | Facility: CLINIC | Age: 62
End: 2022-11-30
Payer: COMMERCIAL

## 2022-11-30 NOTE — TELEPHONE ENCOUNTER
Called pt to inform him that we do not need to see him sooner regarding his concerns of Afib, he would need to see his EP provider, pt expressed understanding and agreed to cancel apt, he already has upcoming follow up apt with EP  Pt stated that he did not go to emergency room despite being advised to by multiple people. Pt expressed understanding

## 2022-11-30 NOTE — TELEPHONE ENCOUNTER
Returned pt call   Pt states his HR was in the 180s according to his athletic wrist band   He states within about 5 min it lowered back to the 70-80s range and he wanted to make sure he does not need to come in because of this (hist of SVT)  Pt stated he had an upcoming follow up and had already gotten it moved up to this Friday by speaking with someone else. Informed pt that if he experiences Cp of SOB to go to the ER and that I will send a message to Dr. Higginbotham and give him a call if I receive any further advice

## 2022-11-30 NOTE — TELEPHONE ENCOUNTER
----- Message from Juan A Mistry sent at 11/30/2022  7:45 AM CST -----  Contact: pt  .Type:  Needs Medical Advice    Who Called: pt  Symptoms (please be specific):  heart rate is flexing   How long has patient had these symptoms:  3 hrs  Pharmacy name and phone #:    Would the patient rather a call back or a response via [facility]? Call back  Best Call Back Number: [phone]  Additional Information: Pt. States his heart rate went up to  190 and it drop down to 82.  He states he would like to speak to someone regarding this matter.

## 2022-12-01 ENCOUNTER — TELEPHONE (OUTPATIENT)
Dept: ELECTROPHYSIOLOGY | Facility: CLINIC | Age: 62
End: 2022-12-01
Payer: COMMERCIAL

## 2022-12-01 DIAGNOSIS — I47.10 SVT (SUPRAVENTRICULAR TACHYCARDIA): Primary | ICD-10-CM

## 2022-12-01 NOTE — TELEPHONE ENCOUNTER
Called patient to reschedule his appointment with Dr. Schaefer from 1/6/23. Patient agreed to appointment on 12/8/22.

## 2022-12-07 ENCOUNTER — TELEPHONE (OUTPATIENT)
Dept: ELECTROPHYSIOLOGY | Facility: CLINIC | Age: 62
End: 2022-12-07
Payer: COMMERCIAL

## 2022-12-08 ENCOUNTER — HOSPITAL ENCOUNTER (OUTPATIENT)
Dept: CARDIOLOGY | Facility: CLINIC | Age: 62
Discharge: HOME | End: 2022-12-08
Payer: COMMERCIAL

## 2022-12-08 ENCOUNTER — OFFICE VISIT (OUTPATIENT)
Dept: ELECTROPHYSIOLOGY | Facility: CLINIC | Age: 62
End: 2022-12-08
Payer: COMMERCIAL

## 2022-12-08 VITALS
DIASTOLIC BLOOD PRESSURE: 64 MMHG | WEIGHT: 293.19 LBS | SYSTOLIC BLOOD PRESSURE: 139 MMHG | HEIGHT: 71 IN | BODY MASS INDEX: 41.05 KG/M2 | HEART RATE: 65 BPM

## 2022-12-08 DIAGNOSIS — E66.01 CLASS 3 SEVERE OBESITY WITH BODY MASS INDEX (BMI) OF 40.0 TO 44.9 IN ADULT, UNSPECIFIED OBESITY TYPE, UNSPECIFIED WHETHER SERIOUS COMORBIDITY PRESENT: ICD-10-CM

## 2022-12-08 DIAGNOSIS — I47.10 SVT (SUPRAVENTRICULAR TACHYCARDIA): ICD-10-CM

## 2022-12-08 DIAGNOSIS — F90.2 ATTENTION DEFICIT HYPERACTIVITY DISORDER (ADHD), COMBINED TYPE: ICD-10-CM

## 2022-12-08 DIAGNOSIS — G47.33 OSA ON CPAP: ICD-10-CM

## 2022-12-08 DIAGNOSIS — I49.8 OTHER CARDIAC ARRHYTHMIA: Primary | ICD-10-CM

## 2022-12-08 DIAGNOSIS — I47.10 SVT (SUPRAVENTRICULAR TACHYCARDIA): Primary | ICD-10-CM

## 2022-12-08 DIAGNOSIS — E78.2 MIXED HYPERLIPIDEMIA: ICD-10-CM

## 2022-12-08 DIAGNOSIS — I10 PRIMARY HYPERTENSION: ICD-10-CM

## 2022-12-08 DIAGNOSIS — I45.10 RBBB: ICD-10-CM

## 2022-12-08 PROCEDURE — 93010 ELECTROCARDIOGRAM REPORT: CPT | Mod: S$GLB,,, | Performed by: INTERNAL MEDICINE

## 2022-12-08 PROCEDURE — 3008F PR BODY MASS INDEX (BMI) DOCUMENTED: ICD-10-PCS | Mod: CPTII,S$GLB,, | Performed by: STUDENT IN AN ORGANIZED HEALTH CARE EDUCATION/TRAINING PROGRAM

## 2022-12-08 PROCEDURE — 1159F MED LIST DOCD IN RCRD: CPT | Mod: CPTII,S$GLB,, | Performed by: STUDENT IN AN ORGANIZED HEALTH CARE EDUCATION/TRAINING PROGRAM

## 2022-12-08 PROCEDURE — 93005 ELECTROCARDIOGRAM TRACING: CPT | Mod: S$GLB,,, | Performed by: STUDENT IN AN ORGANIZED HEALTH CARE EDUCATION/TRAINING PROGRAM

## 2022-12-08 PROCEDURE — 99205 OFFICE O/P NEW HI 60 MIN: CPT | Mod: S$GLB,,, | Performed by: STUDENT IN AN ORGANIZED HEALTH CARE EDUCATION/TRAINING PROGRAM

## 2022-12-08 PROCEDURE — 3078F PR MOST RECENT DIASTOLIC BLOOD PRESSURE < 80 MM HG: ICD-10-PCS | Mod: CPTII,S$GLB,, | Performed by: STUDENT IN AN ORGANIZED HEALTH CARE EDUCATION/TRAINING PROGRAM

## 2022-12-08 PROCEDURE — 3075F PR MOST RECENT SYSTOLIC BLOOD PRESS GE 130-139MM HG: ICD-10-PCS | Mod: CPTII,S$GLB,, | Performed by: STUDENT IN AN ORGANIZED HEALTH CARE EDUCATION/TRAINING PROGRAM

## 2022-12-08 PROCEDURE — 99999 PR PBB SHADOW E&M-EST. PATIENT-LVL III: ICD-10-PCS | Mod: PBBFAC,,, | Performed by: STUDENT IN AN ORGANIZED HEALTH CARE EDUCATION/TRAINING PROGRAM

## 2022-12-08 PROCEDURE — 3078F DIAST BP <80 MM HG: CPT | Mod: CPTII,S$GLB,, | Performed by: STUDENT IN AN ORGANIZED HEALTH CARE EDUCATION/TRAINING PROGRAM

## 2022-12-08 PROCEDURE — 3008F BODY MASS INDEX DOCD: CPT | Mod: CPTII,S$GLB,, | Performed by: STUDENT IN AN ORGANIZED HEALTH CARE EDUCATION/TRAINING PROGRAM

## 2022-12-08 PROCEDURE — 99999 PR PBB SHADOW E&M-EST. PATIENT-LVL III: CPT | Mod: PBBFAC,,, | Performed by: STUDENT IN AN ORGANIZED HEALTH CARE EDUCATION/TRAINING PROGRAM

## 2022-12-08 PROCEDURE — 99205 PR OFFICE/OUTPT VISIT, NEW, LEVL V, 60-74 MIN: ICD-10-PCS | Mod: S$GLB,,, | Performed by: STUDENT IN AN ORGANIZED HEALTH CARE EDUCATION/TRAINING PROGRAM

## 2022-12-08 PROCEDURE — 3075F SYST BP GE 130 - 139MM HG: CPT | Mod: CPTII,S$GLB,, | Performed by: STUDENT IN AN ORGANIZED HEALTH CARE EDUCATION/TRAINING PROGRAM

## 2022-12-08 PROCEDURE — 93010 RHYTHM STRIP: ICD-10-PCS | Mod: S$GLB,,, | Performed by: INTERNAL MEDICINE

## 2022-12-08 PROCEDURE — 1159F PR MEDICATION LIST DOCUMENTED IN MEDICAL RECORD: ICD-10-PCS | Mod: CPTII,S$GLB,, | Performed by: STUDENT IN AN ORGANIZED HEALTH CARE EDUCATION/TRAINING PROGRAM

## 2022-12-08 PROCEDURE — 93005 RHYTHM STRIP: ICD-10-PCS | Mod: S$GLB,,, | Performed by: STUDENT IN AN ORGANIZED HEALTH CARE EDUCATION/TRAINING PROGRAM

## 2022-12-08 RX ORDER — DILTIAZEM HYDROCHLORIDE 120 MG/1
120 CAPSULE, EXTENDED RELEASE ORAL DAILY
Qty: 30 CAPSULE | Refills: 11 | Status: SHIPPED | OUTPATIENT
Start: 2022-12-08 | End: 2023-10-16

## 2022-12-08 RX ORDER — DILTIAZEM HYDROCHLORIDE 30 MG/1
30 TABLET, FILM COATED ORAL 4 TIMES DAILY PRN
Qty: 120 TABLET | Refills: 11 | Status: SHIPPED | OUTPATIENT
Start: 2022-12-08 | End: 2023-12-08

## 2022-12-08 NOTE — PROGRESS NOTES
"Electrophysiology Clinic Note    Reason for new patient visit: Evaluation and recommendations regarding periods of SVT.    PRESENTING HISTORY:     History of Present Illness:  Mr. Aj Marte is a katrina 62-year-old gentleman who presents today for evaluation and recommendations regarding periods of SVT. He has a past medical history significant for a prior episode of SVT, baseline RBBB, ADHD, hyperlipidemia, hypertension, ALEENA maintained on CPAP therapy, and obesity.     He is followed in general cardiology with Dr. Higginbotham and was recently seen in clinic on 11/3/2022. He presented to the ED on 10/22/2022 with complaints of palpitations, noted to have racing heart rates, with HR >200 bpm. He tells me that at the time of this event, he was seated in his office, when he spontaneously started "feeling jittery" for about two hours. He reports feeling like his heart was racing, but denies any symptoms of wayne palpitations or irregular heart beats. When his symptoms did not remit, he presented to the ED for further evaluation. On ECG review from this encounter, he had an episode of SVT with RBBB at a rate of 209 bpm with inability to detect P waves. He was instructed to breathe forcefully into a syringe with spontaneous conversion to sinus tachycardia with RBBB. He was started on metoprolol succinate 25mg po daily and has been on this agent for about one week. He has been monitoring his heart rates with a watch, and notes that he had one issue with increased heart rates since his ED presentation. He is not sure if the metoprolol is working, but feels that he has not had a subsequent event similar to his presentation.       In discussion with Mr. Marte today, he tells me that he is feeling overall quite well. He denies any episodes of dizziness, lightheadedness, syncope/presyncope, chest pain or chest discomfort, nausea or vomiting, orthopnea, lower extremity edema, or PND. He denies wayne palpitations, but " reported an episode of heart racing and worsened shortness of breath as above prompting his ED assessment. He reports baseline mild shortness of breath and dyspnea with exertion that has remained stable for him. He can walk for about a mile without limitation, and can climb more than one flight of stairs prior to needing to take a break. 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 He is cutting back on his caffeine consumption to 2 cups per day. He denies any recreational drug use, and was instructed not to take his ADHD medications until he could be assessed. Off of his ADHD medication, he has a very difficult time focusing and performing his job and he would be interested in resuming his ADHD therapy.      Review of Systems:  Review of Systems   Constitutional:  Negative for activity change.   HENT:  Negative for nasal congestion, nosebleeds, postnasal drip, rhinorrhea, sinus pressure/congestion, sneezing and sore throat.    Respiratory:  Positive for shortness of breath. Negative for apnea, cough, chest tightness and wheezing.    Cardiovascular:  Negative for chest pain, palpitations and leg swelling.        Previously reported an episode of racing heart rates.    Gastrointestinal:  Negative for abdominal distention, abdominal pain, blood in stool, change in bowel habit, constipation, diarrhea, nausea, vomiting and change in bowel habit.   Genitourinary:  Negative for dysuria and hematuria.   Musculoskeletal:  Positive for arthralgias and back pain. Negative for gait problem.        Bilateral shoulder and left knee replacement surgery.    Neurological:  Negative for dizziness, seizures, syncope, weakness, light-headedness, headaches, coordination difficulties and coordination difficulties.      PAST HISTORY:     Past Medical History:   Diagnosis Date    ADHD (attention deficit hyperactivity  "disorder)     Hyperlipidemia        Past Surgical History:   Procedure Laterality Date    KNEE SURGERY      ROTATOR CUFF REPAIR      bilateral    VASECTOMY         Family History:  Family History   Problem Relation Age of Onset    Dementia Mother     Mental illness Mother         dementia    Heart disease Father         CAD  at age 39    Mental illness Son         anxiety ADHD    Heart disease Brother         CAD age  38       Social History:  He  reports that he has never smoked. He has never used smokeless tobacco. He reports that he does not currently use alcohol. He reports that he does not use drugs.      MEDICATIONS & ALLERGIES:     Review of patient's allergies indicates:   Allergen Reactions    Opioids - morphine analogues Nausea And Vomiting       Current Outpatient Medications on File Prior to Visit   Medication Sig Dispense Refill    atorvastatin (LIPITOR) 40 MG tablet Take 1 tablet (40 mg total) by mouth once daily. 90 tablet 3    ibuprofen-diphenhydrAMINE cit (ADVIL PM) 200-38 mg Tab Take 1 tablet by mouth as needed.      ketoconazole (NIZORAL) 2 % shampoo Apply topically. Apply to scalp 2-3 times a week      metoprolol succinate (TOPROL-XL) 25 MG 24 hr tablet Take 1 tablet (25 mg total) by mouth once daily. 30 tablet 11    MYDAYIS 25 mg CT24 Take 1 capsule by mouth once daily. (Patient not taking: Reported on 11/3/2022) 30 each 0     No current facility-administered medications on file prior to visit.        OBJECTIVE:     Vital Signs:  /64   Pulse 65   Ht 5' 11" (1.803 m)   Wt 133 kg (293 lb 3.4 oz)   BMI 40.89 kg/m²     Physical Exam:  Physical Exam  Constitutional:       General: He is not in acute distress.     Appearance: Normal appearance. He is obese. He is not ill-appearing or diaphoretic.      Comments: Well-appearing man in NAD.   HENT:      Head: Normocephalic and atraumatic.      Nose: Nose normal.      Mouth/Throat:      Mouth: Mucous membranes are moist.      Pharynx: " Oropharynx is clear.   Eyes:      Pupils: Pupils are equal, round, and reactive to light.   Cardiovascular:      Rate and Rhythm: Normal rate and regular rhythm.      Pulses: Normal pulses.      Heart sounds: Normal heart sounds. No murmur heard.    No friction rub. No gallop.   Pulmonary:      Effort: Pulmonary effort is normal. No respiratory distress.      Breath sounds: Normal breath sounds. No wheezing, rhonchi or rales.   Chest:      Chest wall: No tenderness.   Abdominal:      General: There is no distension.      Palpations: Abdomen is soft.      Tenderness: There is no abdominal tenderness.   Musculoskeletal:         General: No swelling or tenderness.      Cervical back: Normal range of motion.      Right lower leg: No edema.      Left lower leg: No edema.   Skin:     General: Skin is warm and dry.      Findings: No erythema, lesion or rash.   Neurological:      General: No focal deficit present.      Mental Status: He is alert and oriented to person, place, and time. Mental status is at baseline.      Motor: No weakness.      Gait: Gait normal.   Psychiatric:         Mood and Affect: Mood normal.         Behavior: Behavior normal.        Laboratory Data:  Lab Results   Component Value Date    WBC 10.24 10/21/2022    HGB 16.3 10/21/2022    HCT 48.9 10/21/2022    MCV 85 10/21/2022     10/21/2022     Lab Results   Component Value Date     10/21/2022     10/21/2022    K 5.0 10/21/2022     10/21/2022    CO2 20 (L) 10/21/2022    BUN 14 10/21/2022    CREATININE 1.1 10/21/2022    CALCIUM 9.9 10/21/2022    MG 1.7 10/21/2022     No results found for: INR, PROTIME    Pertinent Cardiac Data:  ECG: Normal sinus rhythm with blocked PAC, rate of 65 bpm,  ms, RBBB with  ms, QT/QTc 442/459 ms.     Resting 2D Transthoracic Echocardiogram - 10/22/2022:  The left ventricle is normal in size with concentric remodeling and normal systolic function.  The estimated ejection fraction is  55%.  Normal left ventricular diastolic function.  Mild right ventricular enlargement with normal right ventricular systolic function.  Mild to moderate left atrial enlargement.  Mild right atrial enlargement.  Intermediate central venous pressure (8 mmHg).  The estimated PA systolic pressure is 22 mmHg.      ASSESSMENT & PLAN:   Mr. Aj Marte is a katrina 62-year-old gentleman who presents today for evaluation and recommendations regarding periods of SVT. He has a past medical history significant for a prior episode of SVT, baseline RBBB, ADHD, hyperlipidemia, hypertension, ALEENA maintained on CPAP therapy, and obesity.     - We discussed the pathophysiology of supraventricular tachycardia, and the fact that SVT is an umbrella term that encompasses several different types of atrial arrhythmias. We reviewed the results of his prior presentation to the ED with an episode of SVT with a rate of approximately 210 bpm. This SVT may represent AVNRT, AVRT, or less likely, a JT. 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 A rudimentary diagram was drawn for the patient to assist in education.   - We discussed the concept of undergoing an EP study in order to better characterize his SVT and for possible definitive ablation. We discussed undergoing radiofrequency ablation with slow pathway modification. The procedure itself, risks, benefits, and alternative options were discussed. As Mr. Marte reports that he has had one sustained episode, he would prefer to continue pharmacologic suppression prior to consideration of EPS and ablation.   - We reviewed the results of his recent echocardiogram revealing preserved systolic function with LVEF 55%.  - He was prescribed metoprolol succinate 25mg po daily, but self-discontinued this agent, as he felt no difference in his symptoms. We will prescribe diltiazem 120mg po daily, with a  short-acting diltiazem 30mg po PRN palpitations.  - We discussed abortive measures when he has an episode of palpitations, such as forced coughing, Valsalva maneuver, modified Valsalva maneuver with forced exhalation through a high resistance structure (such a drinking straw), unilateral carotid sinus massage, and ice/cold water application to the face and eyes. When he has an episode of recurrent palpitations, he was instructed to attempt physical abortive measures first. In the event this is ineffective, he was instructed to take a short-acting diltiazem and continue abortive maneuvers. Should his palpitations persist despite abortive maneuvers and short-acting diltiazem, he was instructed to present to the ED for possible adenosine administration or cardioversion.     This patient will return to clinic with me in four months for ongoing surveillance. In the event he has a subsequent event of sustained palpitations, he was instructed to contact my office for consideration for EPS and ablation. All questions and concerns were addressed at this encounter.     Signing Physician:       KB Schaefer MD  Electrophysiology Attending

## 2023-02-03 ENCOUNTER — OFFICE VISIT (OUTPATIENT)
Dept: CARDIOLOGY | Facility: CLINIC | Age: 63
End: 2023-02-03
Payer: COMMERCIAL

## 2023-02-03 VITALS
HEART RATE: 63 BPM | OXYGEN SATURATION: 96 % | DIASTOLIC BLOOD PRESSURE: 68 MMHG | WEIGHT: 297 LBS | SYSTOLIC BLOOD PRESSURE: 120 MMHG | HEIGHT: 71 IN | BODY MASS INDEX: 41.58 KG/M2

## 2023-02-03 DIAGNOSIS — E66.01 CLASS 3 SEVERE OBESITY DUE TO EXCESS CALORIES WITH SERIOUS COMORBIDITY AND BODY MASS INDEX (BMI) OF 40.0 TO 44.9 IN ADULT: ICD-10-CM

## 2023-02-03 DIAGNOSIS — E78.5 DYSLIPIDEMIA: ICD-10-CM

## 2023-02-03 DIAGNOSIS — G47.33 OSA ON CPAP: ICD-10-CM

## 2023-02-03 DIAGNOSIS — E66.01 CLASS 3 SEVERE OBESITY WITH BODY MASS INDEX (BMI) OF 40.0 TO 44.9 IN ADULT, UNSPECIFIED OBESITY TYPE, UNSPECIFIED WHETHER SERIOUS COMORBIDITY PRESENT: ICD-10-CM

## 2023-02-03 DIAGNOSIS — I47.10 SVT (SUPRAVENTRICULAR TACHYCARDIA): ICD-10-CM

## 2023-02-03 PROBLEM — E66.813 CLASS 3 SEVERE OBESITY DUE TO EXCESS CALORIES WITH SERIOUS COMORBIDITY AND BODY MASS INDEX (BMI) OF 40.0 TO 44.9 IN ADULT: Status: ACTIVE | Noted: 2022-10-22

## 2023-02-03 PROBLEM — R94.31 QT PROLONGATION: Status: RESOLVED | Noted: 2022-10-21 | Resolved: 2023-02-03

## 2023-02-03 PROBLEM — R00.0 TACHYCARDIA: Status: RESOLVED | Noted: 2022-10-21 | Resolved: 2023-02-03

## 2023-02-03 PROCEDURE — 3078F PR MOST RECENT DIASTOLIC BLOOD PRESSURE < 80 MM HG: ICD-10-PCS | Mod: CPTII,S$GLB,, | Performed by: INTERNAL MEDICINE

## 2023-02-03 PROCEDURE — 1159F PR MEDICATION LIST DOCUMENTED IN MEDICAL RECORD: ICD-10-PCS | Mod: CPTII,S$GLB,, | Performed by: INTERNAL MEDICINE

## 2023-02-03 PROCEDURE — 3074F PR MOST RECENT SYSTOLIC BLOOD PRESSURE < 130 MM HG: ICD-10-PCS | Mod: CPTII,S$GLB,, | Performed by: INTERNAL MEDICINE

## 2023-02-03 PROCEDURE — 99214 PR OFFICE/OUTPT VISIT, EST, LEVL IV, 30-39 MIN: ICD-10-PCS | Mod: S$GLB,,, | Performed by: INTERNAL MEDICINE

## 2023-02-03 PROCEDURE — 3008F PR BODY MASS INDEX (BMI) DOCUMENTED: ICD-10-PCS | Mod: CPTII,S$GLB,, | Performed by: INTERNAL MEDICINE

## 2023-02-03 PROCEDURE — 1159F MED LIST DOCD IN RCRD: CPT | Mod: CPTII,S$GLB,, | Performed by: INTERNAL MEDICINE

## 2023-02-03 PROCEDURE — 99214 OFFICE O/P EST MOD 30 MIN: CPT | Mod: S$GLB,,, | Performed by: INTERNAL MEDICINE

## 2023-02-03 PROCEDURE — 3074F SYST BP LT 130 MM HG: CPT | Mod: CPTII,S$GLB,, | Performed by: INTERNAL MEDICINE

## 2023-02-03 PROCEDURE — 1160F PR REVIEW ALL MEDS BY PRESCRIBER/CLIN PHARMACIST DOCUMENTED: ICD-10-PCS | Mod: CPTII,S$GLB,, | Performed by: INTERNAL MEDICINE

## 2023-02-03 PROCEDURE — 1160F RVW MEDS BY RX/DR IN RCRD: CPT | Mod: CPTII,S$GLB,, | Performed by: INTERNAL MEDICINE

## 2023-02-03 PROCEDURE — 3078F DIAST BP <80 MM HG: CPT | Mod: CPTII,S$GLB,, | Performed by: INTERNAL MEDICINE

## 2023-02-03 PROCEDURE — 3008F BODY MASS INDEX DOCD: CPT | Mod: CPTII,S$GLB,, | Performed by: INTERNAL MEDICINE

## 2023-02-03 PROCEDURE — 99999 PR PBB SHADOW E&M-EST. PATIENT-LVL III: CPT | Mod: PBBFAC,,, | Performed by: INTERNAL MEDICINE

## 2023-02-03 PROCEDURE — 99999 PR PBB SHADOW E&M-EST. PATIENT-LVL III: ICD-10-PCS | Mod: PBBFAC,,, | Performed by: INTERNAL MEDICINE

## 2023-02-03 NOTE — PROGRESS NOTES
Subjective:    Patient ID:  Aj Marte is a 62 y.o. male who presents for follow-up of Follow-up (3 month follow up)        PCP: Alecia Mcghee MD     HPI  Pt is a 61 yo M w/ PMH of ADHD, ALEENA on CPAP, and HLD who presents for evaluation s/p hospitalization.  He was admitted 10/21/2022-10/22/2022 for palpitations and was noted to have SVT (AVnRT) which resolved w/ vagal maneuvers and was evaluated by cardiology and was made started on metoprolol and refer to EP.  He was last seen 11/3/2022 and self d/c'd the metoprolol and was evaluated by EP and declined EP study and RFA and was started on dilt.  Since this time he has been doing well and denies further episodes of palpitations.  He denies cp, sob, edema, orthopnea, PND, presyncope, LOC, and claudication.  He notes compliance w/ meds and denies side effects.  He monitors his BP at work weekly and notes that it runs 120s/60-70s.  He does not exercise and states that he has some knee pain which limits his activity.        Past Medical History:   Diagnosis Date    ADHD (attention deficit hyperactivity disorder)     Hyperlipidemia      Past Surgical History:   Procedure Laterality Date    KNEE SURGERY      ROTATOR CUFF REPAIR      bilateral    VASECTOMY       Social History     Socioeconomic History    Marital status:    Tobacco Use    Smoking status: Never    Smokeless tobacco: Never   Substance and Sexual Activity    Alcohol use: Not Currently    Drug use: Never    Sexual activity: Yes     Social Determinants of Health     Financial Resource Strain: Low Risk     Difficulty of Paying Living Expenses: Not very hard   Food Insecurity: No Food Insecurity    Worried About Running Out of Food in the Last Year: Never true    Ran Out of Food in the Last Year: Never true   Transportation Needs: Unknown    Lack of Transportation (Medical): Patient refused    Lack of Transportation (Non-Medical): Patient refused   Physical Activity: Sufficiently Active    Days of  Exercise per Week: 6 days    Minutes of Exercise per Session: 50 min   Stress: Stress Concern Present    Feeling of Stress : To some extent   Social Connections: Unknown    Frequency of Communication with Friends and Family: More than three times a week    Frequency of Social Gatherings with Friends and Family: More than three times a week    Attends Yazdanism Services: Patient refused    Active Member of Clubs or Organizations: Patient refused    Attends Club or Organization Meetings: Patient refused    Marital Status: Living with partner   Housing Stability: Unknown    Unable to Pay for Housing in the Last Year: No    Number of Places Lived in the Last Year: 1    Unstable Housing in the Last Year: Patient refused     Family History   Problem Relation Age of Onset    Dementia Mother     Mental illness Mother         dementia    Heart disease Father         CAD  at age 39    Mental illness Son         anxiety ADHD    Heart disease Brother         CAD age  38       Review of patient's allergies indicates:   Allergen Reactions    Opioids - morphine analogues Nausea And Vomiting       Medication List with Changes/Refills   Current Medications    ATORVASTATIN (LIPITOR) 40 MG TABLET    Take 1 tablet (40 mg total) by mouth once daily.    DILTIAZEM (CARDIZEM) 30 MG TABLET    Take 1 tablet (30 mg total) by mouth 4 (four) times daily as needed (Palpititions).    DILTIAZEM (DILACOR XR) 120 MG CDCR    Take 1 capsule (120 mg total) by mouth once daily.    IBUPROFEN-DIPHENHYDRAMINE CIT (ADVIL PM) 200-38 MG TAB    Take 1 tablet by mouth as needed.    KETOCONAZOLE (NIZORAL) 2 % SHAMPOO    Apply topically. Apply to scalp 2-3 times a week    MYDAYIS 25 MG CT24    Take 1 capsule by mouth once daily.       Review of Systems   Constitutional: Negative for diaphoresis and fever.   HENT:  Negative for congestion and hearing loss.    Eyes:  Negative for blurred vision and pain.   Cardiovascular:  Negative for chest pain,  "claudication, dyspnea on exertion, leg swelling, near-syncope, palpitations and syncope.   Respiratory:  Negative for shortness of breath and sleep disturbances due to breathing.    Hematologic/Lymphatic: Negative for bleeding problem. Does not bruise/bleed easily.   Skin:  Negative for color change and poor wound healing.   Gastrointestinal:  Negative for abdominal pain and nausea.   Genitourinary:  Negative for bladder incontinence and flank pain.   Neurological:  Negative for focal weakness and light-headedness.      Objective:   /68 (BP Location: Left arm, Patient Position: Sitting, BP Method: Large (Manual))   Pulse 63   Ht 5' 11" (1.803 m)   Wt 134.7 kg (297 lb)   SpO2 96%   BMI 41.42 kg/m²    Physical Exam  Constitutional:       Appearance: He is well-developed. He is obese. He is not diaphoretic.   HENT:      Head: Normocephalic and atraumatic.   Eyes:      General: No scleral icterus.     Pupils: Pupils are equal, round, and reactive to light.   Neck:      Vascular: No JVD.   Cardiovascular:      Rate and Rhythm: Normal rate and regular rhythm.      Pulses: Intact distal pulses.      Heart sounds: S1 normal and S2 normal. No murmur heard.    No friction rub. No gallop.   Pulmonary:      Effort: Pulmonary effort is normal. No respiratory distress.      Breath sounds: Normal breath sounds. No wheezing or rales.   Chest:      Chest wall: No tenderness.   Abdominal:      General: Bowel sounds are normal. There is no distension.      Palpations: Abdomen is soft. There is no mass.      Tenderness: There is no abdominal tenderness. There is no rebound.   Musculoskeletal:         General: No tenderness. Normal range of motion.      Cervical back: Normal range of motion and neck supple.   Skin:     General: Skin is warm and dry.      Coloration: Skin is not pale.   Neurological:      Mental Status: He is alert and oriented to person, place, and time.      Coordination: Coordination normal.      Deep " Tendon Reflexes: Reflexes normal.   Psychiatric:         Behavior: Behavior normal.         Judgment: Judgment normal.         ECG: 10/21/2022- reviewed.  SVT (AVnRT), RBBB, ?inferior infarct.      Echo: 10/22/2022- reviewed.    Summary    The left ventricle is normal in size with concentric remodeling and normal systolic function.  The estimated ejection fraction is 55%.  Normal left ventricular diastolic function.  Mild right ventricular enlargement with normal right ventricular systolic function.  Mild to moderate left atrial enlargement.  Mild right atrial enlargement.  Intermediate central venous pressure (8 mmHg).  The estimated PA systolic pressure is 22 mmHg.    Assessment:       1. SVT (supraventricular tachycardia)    2. Class 3 severe obesity with body mass index (BMI) of 40.0 to 44.9 in adult, unspecified obesity type, unspecified whether serious comorbidity present    3. Dyslipidemia    4. Class 3 severe obesity due to excess calories with serious comorbidity and body mass index (BMI) of 40.0 to 44.9 in adult    5. ALEENA on CPAP         Plan:         Dyslipidemia  Controlled.  Goal LDL < 130, last LDL 82 4/2022.  Compliant w/ statin.    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  Pt denies further episodes of palpitations.    - followed by EP and compliant w/ dilt   - risk factor and lifestyle modifications      Class 3 severe obesity due to excess calories with serious comorbidity and body mass index (BMI) of 40.0 to 44.9 in adult  Encouraged lifestyle modifications (diet, exercise, and weight loss).        ALEENA on CPAP  Compliant w/ CPAP.        Total duration of face to face visit time 30 minutes.  Total time spent counseling greater than fifty percent of total visit time.  Counseling included discussion regarding imaging findings, diagnosis, possibilities, treatment options, risks and benefits.  The patient had  many questions regarding the options and long-term effects      Mat Higginbotham M.D.  Interventional Cardiology

## 2023-02-03 NOTE — ASSESSMENT & PLAN NOTE
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  Pt denies further episodes of palpitations.    - followed by EP and compliant w/ dilt   - risk factor and lifestyle modifications

## 2023-03-07 ENCOUNTER — LAB VISIT (OUTPATIENT)
Dept: LAB | Facility: HOSPITAL | Age: 63
End: 2023-03-07
Attending: INTERNAL MEDICINE
Payer: COMMERCIAL

## 2023-03-07 DIAGNOSIS — Z00.00 PREVENTATIVE HEALTH CARE: ICD-10-CM

## 2023-03-07 LAB
ALBUMIN SERPL BCP-MCNC: 4 G/DL (ref 3.5–5.2)
ALP SERPL-CCNC: 69 U/L (ref 55–135)
ALT SERPL W/O P-5'-P-CCNC: 31 U/L (ref 10–44)
ANION GAP SERPL CALC-SCNC: 7 MMOL/L (ref 8–16)
AST SERPL-CCNC: 25 U/L (ref 10–40)
BASOPHILS # BLD AUTO: 0.03 K/UL (ref 0–0.2)
BASOPHILS NFR BLD: 0.5 % (ref 0–1.9)
BILIRUB SERPL-MCNC: 0.8 MG/DL (ref 0.1–1)
BUN SERPL-MCNC: 17 MG/DL (ref 6–20)
CALCIUM SERPL-MCNC: 9.5 MG/DL (ref 8.7–10.5)
CHLORIDE SERPL-SCNC: 104 MMOL/L (ref 95–110)
CHOLEST SERPL-MCNC: 123 MG/DL (ref 120–199)
CHOLEST/HDLC SERPL: 3.4 {RATIO} (ref 2–5)
CO2 SERPL-SCNC: 28 MMOL/L (ref 23–29)
CREAT SERPL-MCNC: 1 MG/DL (ref 0.5–1.4)
DIFFERENTIAL METHOD: NORMAL
EOSINOPHIL # BLD AUTO: 0.2 K/UL (ref 0–0.5)
EOSINOPHIL NFR BLD: 2.6 % (ref 0–8)
ERYTHROCYTE [DISTWIDTH] IN BLOOD BY AUTOMATED COUNT: 12.9 % (ref 11.5–14.5)
EST. GFR  (NO RACE VARIABLE): >60 ML/MIN/1.73 M^2
GLUCOSE SERPL-MCNC: 101 MG/DL (ref 70–110)
HCT VFR BLD AUTO: 44.6 % (ref 40–54)
HDLC SERPL-MCNC: 36 MG/DL (ref 40–75)
HDLC SERPL: 29.3 % (ref 20–50)
HGB BLD-MCNC: 14.4 G/DL (ref 14–18)
IMM GRANULOCYTES # BLD AUTO: 0.03 K/UL (ref 0–0.04)
IMM GRANULOCYTES NFR BLD AUTO: 0.5 % (ref 0–0.5)
LDLC SERPL CALC-MCNC: 75.4 MG/DL (ref 63–159)
LYMPHOCYTES # BLD AUTO: 1.4 K/UL (ref 1–4.8)
LYMPHOCYTES NFR BLD: 21.6 % (ref 18–48)
MCH RBC QN AUTO: 28.3 PG (ref 27–31)
MCHC RBC AUTO-ENTMCNC: 32.3 G/DL (ref 32–36)
MCV RBC AUTO: 88 FL (ref 82–98)
MONOCYTES # BLD AUTO: 0.6 K/UL (ref 0.3–1)
MONOCYTES NFR BLD: 9.1 % (ref 4–15)
NEUTROPHILS # BLD AUTO: 4.3 K/UL (ref 1.8–7.7)
NEUTROPHILS NFR BLD: 65.7 % (ref 38–73)
NONHDLC SERPL-MCNC: 87 MG/DL
NRBC BLD-RTO: 0 /100 WBC
PLATELET # BLD AUTO: 172 K/UL (ref 150–450)
PMV BLD AUTO: 12 FL (ref 9.2–12.9)
POTASSIUM SERPL-SCNC: 4.6 MMOL/L (ref 3.5–5.1)
PROT SERPL-MCNC: 7 G/DL (ref 6–8.4)
RBC # BLD AUTO: 5.09 M/UL (ref 4.6–6.2)
SODIUM SERPL-SCNC: 139 MMOL/L (ref 136–145)
TRIGL SERPL-MCNC: 58 MG/DL (ref 30–150)
TSH SERPL DL<=0.005 MIU/L-ACNC: 2.24 UIU/ML (ref 0.4–4)
WBC # BLD AUTO: 6.58 K/UL (ref 3.9–12.7)

## 2023-03-07 PROCEDURE — 80061 LIPID PANEL: CPT | Performed by: INTERNAL MEDICINE

## 2023-03-07 PROCEDURE — 36415 COLL VENOUS BLD VENIPUNCTURE: CPT | Mod: PO | Performed by: INTERNAL MEDICINE

## 2023-03-07 PROCEDURE — 80053 COMPREHEN METABOLIC PANEL: CPT | Performed by: INTERNAL MEDICINE

## 2023-03-07 PROCEDURE — 84153 ASSAY OF PSA TOTAL: CPT | Performed by: INTERNAL MEDICINE

## 2023-03-07 PROCEDURE — 84443 ASSAY THYROID STIM HORMONE: CPT | Performed by: INTERNAL MEDICINE

## 2023-03-07 PROCEDURE — 85025 COMPLETE CBC W/AUTO DIFF WBC: CPT | Performed by: INTERNAL MEDICINE

## 2023-03-08 LAB — COMPLEXED PSA SERPL-MCNC: 2.1 NG/ML (ref 0–4)

## 2023-03-20 ENCOUNTER — OFFICE VISIT (OUTPATIENT)
Dept: INTERNAL MEDICINE | Facility: CLINIC | Age: 63
End: 2023-03-20
Payer: COMMERCIAL

## 2023-03-20 VITALS
WEIGHT: 300.94 LBS | SYSTOLIC BLOOD PRESSURE: 120 MMHG | HEART RATE: 81 BPM | OXYGEN SATURATION: 96 % | DIASTOLIC BLOOD PRESSURE: 82 MMHG | BODY MASS INDEX: 42.13 KG/M2 | HEIGHT: 71 IN

## 2023-03-20 DIAGNOSIS — E78.5 DYSLIPIDEMIA: ICD-10-CM

## 2023-03-20 DIAGNOSIS — F90.9 ATTENTION DEFICIT HYPERACTIVITY DISORDER (ADHD), UNSPECIFIED ADHD TYPE: Primary | ICD-10-CM

## 2023-03-20 DIAGNOSIS — E66.01 MORBID OBESITY WITH BODY MASS INDEX (BMI) OF 40.0 OR HIGHER: ICD-10-CM

## 2023-03-20 PROCEDURE — 3079F PR MOST RECENT DIASTOLIC BLOOD PRESSURE 80-89 MM HG: ICD-10-PCS | Mod: CPTII,S$GLB,, | Performed by: INTERNAL MEDICINE

## 2023-03-20 PROCEDURE — 1160F RVW MEDS BY RX/DR IN RCRD: CPT | Mod: CPTII,S$GLB,, | Performed by: INTERNAL MEDICINE

## 2023-03-20 PROCEDURE — 3008F BODY MASS INDEX DOCD: CPT | Mod: CPTII,S$GLB,, | Performed by: INTERNAL MEDICINE

## 2023-03-20 PROCEDURE — 3079F DIAST BP 80-89 MM HG: CPT | Mod: CPTII,S$GLB,, | Performed by: INTERNAL MEDICINE

## 2023-03-20 PROCEDURE — 1160F PR REVIEW ALL MEDS BY PRESCRIBER/CLIN PHARMACIST DOCUMENTED: ICD-10-PCS | Mod: CPTII,S$GLB,, | Performed by: INTERNAL MEDICINE

## 2023-03-20 PROCEDURE — 3008F PR BODY MASS INDEX (BMI) DOCUMENTED: ICD-10-PCS | Mod: CPTII,S$GLB,, | Performed by: INTERNAL MEDICINE

## 2023-03-20 PROCEDURE — 3074F SYST BP LT 130 MM HG: CPT | Mod: CPTII,S$GLB,, | Performed by: INTERNAL MEDICINE

## 2023-03-20 PROCEDURE — 99214 PR OFFICE/OUTPT VISIT, EST, LEVL IV, 30-39 MIN: ICD-10-PCS | Mod: S$GLB,,, | Performed by: INTERNAL MEDICINE

## 2023-03-20 PROCEDURE — 99214 OFFICE O/P EST MOD 30 MIN: CPT | Mod: S$GLB,,, | Performed by: INTERNAL MEDICINE

## 2023-03-20 PROCEDURE — 99999 PR PBB SHADOW E&M-EST. PATIENT-LVL IV: ICD-10-PCS | Mod: PBBFAC,,, | Performed by: INTERNAL MEDICINE

## 2023-03-20 PROCEDURE — 1159F PR MEDICATION LIST DOCUMENTED IN MEDICAL RECORD: ICD-10-PCS | Mod: CPTII,S$GLB,, | Performed by: INTERNAL MEDICINE

## 2023-03-20 PROCEDURE — 1159F MED LIST DOCD IN RCRD: CPT | Mod: CPTII,S$GLB,, | Performed by: INTERNAL MEDICINE

## 2023-03-20 PROCEDURE — 99999 PR PBB SHADOW E&M-EST. PATIENT-LVL IV: CPT | Mod: PBBFAC,,, | Performed by: INTERNAL MEDICINE

## 2023-03-20 PROCEDURE — 3074F PR MOST RECENT SYSTOLIC BLOOD PRESSURE < 130 MM HG: ICD-10-PCS | Mod: CPTII,S$GLB,, | Performed by: INTERNAL MEDICINE

## 2023-03-20 RX ORDER — DEXTROAMPHETAMINE SULFATE, DEXTROAMPHETAMINE SACCHARATE, AMPHETAMINE ASPARTATE MONOHYDRATE, AND AMPHETAMINE SULFATE 6.25; 6.25; 6.25; 6.25 MG/1; MG/1; MG/1; MG/1
1 CAPSULE, EXTENDED RELEASE ORAL DAILY
Qty: 30 EACH | Refills: 0 | Status: SHIPPED | OUTPATIENT
Start: 2023-03-20 | End: 2023-06-27

## 2023-03-20 NOTE — PROGRESS NOTES
Assessment:       1. Attention deficit hyperactivity disorder (ADHD), unspecified ADHD type    2. Morbid obesity with body mass index (BMI) of 40.0 or higher    3. Dyslipidemia          Plan:         1. Attention deficit hyperactivity disorder (ADHD), unspecified ADHD type  Chronic  Controlled  Patient is at goal today   I have reviewed lifestyle modification to achieve/maintain goals  We will continue the current medication regimen as listed below  Patient will follow up in 3 months   - MYDAYIS 25 mg CT24; Take 1 capsule by mouth once daily.  Dispense: 30 each; Refill: 0    2. Morbid obesity with body mass index (BMI) of 40.0 or higher  Chronic  Uncontrolled  Patient is not at goal today  I have reviewed lifestyle modification to achieve/maintain goals    Patient will follow up in 3 months     3. Dyslipidemia  Chronic  Controlled  Patient is at goal today   I have reviewed lifestyle modification to achieve/maintain goals  We will continue the current medication regimen as listed below  Patient will follow up in 3 months             Subjective:       Patient ID: Aj Marte is a 62 y.o. male.    Chief Complaint: Follow-up and Results        Interim Hx  Seen by cardiology, EP, 10/22/22     Concerns today  Refills, patient will be having knee arthroscopy in november    Chronic problems   Patient Active Problem List   Diagnosis    Dyslipidemia    Attention deficit hyperactivity disorder (ADHD)    History of COVID-19    SVT (supraventricular tachycardia)    Class 3 severe obesity due to excess calories with serious comorbidity and body mass index (BMI) of 40.0 to 44.9 in adult    ALEENA on CPAP         Hyperlipidemia  This is a chronic problem. The problem is controlled. Recent lipid tests were reviewed and are normal. Current antihyperlipidemic treatment includes statins.     Review of Systems   All other systems reviewed and are negative.          Health Maintenance Due   Topic Date Due    Hemoglobin A1c  "(Diabetic Prevention Screening)  Never done    Shingles Vaccine (1 of 2) Never done    COVID-19 Vaccine (3 - Booster for Moderna series) 10/22/2021    Influenza Vaccine (1) 09/01/2022         Objective:     /82 (BP Location: Right arm, Patient Position: Sitting, BP Method: Large (Manual))   Pulse 81   Ht 5' 11" (1.803 m)   Wt (!) 136.5 kg (300 lb 14.9 oz)   SpO2 96%   BMI 41.97 kg/m²     Vitals 3/20/2023 2/3/2023 12/8/2022 11/3/2022 10/22/2022   Height 71 71 71 71 71   Weight (lbs) 300.93 297 293.21 286 279   BMI (kg/m2) 42 41.4 40.9 39.9 38.9                Physical Exam  Vitals and nursing note reviewed.   Constitutional:       General: He is not in acute distress.     Appearance: He is well-developed. He is not ill-appearing, toxic-appearing or diaphoretic.   HENT:      Head: Normocephalic.   Eyes:      Conjunctiva/sclera: Conjunctivae normal.   Cardiovascular:      Rate and Rhythm: Normal rate and regular rhythm.      Heart sounds: Normal heart sounds. No murmur heard.    No friction rub. No gallop.   Pulmonary:      Effort: Pulmonary effort is normal. No respiratory distress.   Abdominal:      General: There is no distension.      Palpations: Abdomen is soft.   Neurological:      General: No focal deficit present.      Mental Status: He is alert and oriented to person, place, and time.           Future Appointments   Date Time Provider Department Center   4/10/2023  8:00 AM EKG, APPT [facility] EKG [facility]   4/10/2023  8:20 AM NOE Schaefer MD [facility] ARRHYTH [facility]   6/23/2023 11:00 AM Alecia Mcghee MD [facility]         Medication List with Changes/Refills   Current Medications    ATORVASTATIN (LIPITOR) 40 MG TABLET    TAKE 1 TABLET DAILY    DILTIAZEM (CARDIZEM) 30 MG TABLET    Take 1 tablet (30 mg total) by mouth 4 (four) times daily as needed (Palpititions).    DILTIAZEM (DILACOR XR) 120 MG CDCR    Take 1 capsule (120 mg total) by mouth once daily.    IBUPROFEN-DIPHENHYDRAMINE CIT (ADVIL " PM) 200-38 MG TAB    Take 1 tablet by mouth as needed.    KETOCONAZOLE (NIZORAL) 2 % SHAMPOO    Apply topically. Apply to scalp 2-3 times a week   Changed and/or Refilled Medications    Modified Medication Previous Medication    MYDAYIS 25 MG CT24 MYDAYIS 25 mg CT24       Take 1 capsule by mouth once daily.    Take 1 capsule by mouth once daily.         Disclaimer:  This note has been generated using voice-recognition software. There may be grammatical or spelling errors that have been missed during proof-reading

## 2023-03-31 ENCOUNTER — TELEPHONE (OUTPATIENT)
Dept: CARDIOLOGY | Facility: CLINIC | Age: 63
End: 2023-03-31
Payer: COMMERCIAL

## 2023-03-31 NOTE — TELEPHONE ENCOUNTER
Called to inform patient that his progress notes was faxed over for his procedure. There was no answer so I left a message for the patient to return my call.

## 2023-04-21 ENCOUNTER — OFFICE VISIT (OUTPATIENT)
Dept: ELECTROPHYSIOLOGY | Facility: CLINIC | Age: 63
End: 2023-04-21
Payer: COMMERCIAL

## 2023-04-21 ENCOUNTER — HOSPITAL ENCOUNTER (OUTPATIENT)
Dept: CARDIOLOGY | Facility: CLINIC | Age: 63
Discharge: HOME | End: 2023-04-21
Payer: COMMERCIAL

## 2023-04-21 VITALS
DIASTOLIC BLOOD PRESSURE: 70 MMHG | HEIGHT: 71 IN | BODY MASS INDEX: 41.63 KG/M2 | SYSTOLIC BLOOD PRESSURE: 150 MMHG | HEART RATE: 67 BPM | WEIGHT: 297.38 LBS

## 2023-04-21 DIAGNOSIS — E78.2 MIXED HYPERLIPIDEMIA: ICD-10-CM

## 2023-04-21 DIAGNOSIS — I45.10 RBBB: ICD-10-CM

## 2023-04-21 DIAGNOSIS — E66.01 CLASS 3 SEVERE OBESITY WITH BODY MASS INDEX (BMI) OF 40.0 TO 44.9 IN ADULT, UNSPECIFIED OBESITY TYPE, UNSPECIFIED WHETHER SERIOUS COMORBIDITY PRESENT: ICD-10-CM

## 2023-04-21 DIAGNOSIS — I10 PRIMARY HYPERTENSION: ICD-10-CM

## 2023-04-21 DIAGNOSIS — M15.9 OSTEOARTHRITIS OF MULTIPLE JOINTS, UNSPECIFIED OSTEOARTHRITIS TYPE: ICD-10-CM

## 2023-04-21 DIAGNOSIS — Z86.16 HISTORY OF COVID-19: ICD-10-CM

## 2023-04-21 DIAGNOSIS — I49.8 OTHER CARDIAC ARRHYTHMIA: ICD-10-CM

## 2023-04-21 DIAGNOSIS — G47.33 OSA ON CPAP: ICD-10-CM

## 2023-04-21 DIAGNOSIS — I47.10 SVT (SUPRAVENTRICULAR TACHYCARDIA): Primary | ICD-10-CM

## 2023-04-21 DIAGNOSIS — F90.2 ATTENTION DEFICIT HYPERACTIVITY DISORDER (ADHD), COMBINED TYPE: ICD-10-CM

## 2023-04-21 PROCEDURE — 3008F PR BODY MASS INDEX (BMI) DOCUMENTED: ICD-10-PCS | Mod: CPTII,S$GLB,, | Performed by: STUDENT IN AN ORGANIZED HEALTH CARE EDUCATION/TRAINING PROGRAM

## 2023-04-21 PROCEDURE — 3077F SYST BP >= 140 MM HG: CPT | Mod: CPTII,S$GLB,, | Performed by: STUDENT IN AN ORGANIZED HEALTH CARE EDUCATION/TRAINING PROGRAM

## 2023-04-21 PROCEDURE — 3077F PR MOST RECENT SYSTOLIC BLOOD PRESSURE >= 140 MM HG: ICD-10-PCS | Mod: CPTII,S$GLB,, | Performed by: STUDENT IN AN ORGANIZED HEALTH CARE EDUCATION/TRAINING PROGRAM

## 2023-04-21 PROCEDURE — 99214 OFFICE O/P EST MOD 30 MIN: CPT | Mod: S$GLB,,, | Performed by: STUDENT IN AN ORGANIZED HEALTH CARE EDUCATION/TRAINING PROGRAM

## 2023-04-21 PROCEDURE — 93010 RHYTHM STRIP: ICD-10-PCS | Mod: S$GLB,,, | Performed by: INTERNAL MEDICINE

## 2023-04-21 PROCEDURE — 99999 PR PBB SHADOW E&M-EST. PATIENT-LVL III: CPT | Mod: PBBFAC,,, | Performed by: STUDENT IN AN ORGANIZED HEALTH CARE EDUCATION/TRAINING PROGRAM

## 2023-04-21 PROCEDURE — 3008F BODY MASS INDEX DOCD: CPT | Mod: CPTII,S$GLB,, | Performed by: STUDENT IN AN ORGANIZED HEALTH CARE EDUCATION/TRAINING PROGRAM

## 2023-04-21 PROCEDURE — 3078F DIAST BP <80 MM HG: CPT | Mod: CPTII,S$GLB,, | Performed by: STUDENT IN AN ORGANIZED HEALTH CARE EDUCATION/TRAINING PROGRAM

## 2023-04-21 PROCEDURE — 93005 RHYTHM STRIP: ICD-10-PCS | Mod: S$GLB,,, | Performed by: STUDENT IN AN ORGANIZED HEALTH CARE EDUCATION/TRAINING PROGRAM

## 2023-04-21 PROCEDURE — 93005 ELECTROCARDIOGRAM TRACING: CPT | Mod: S$GLB,,, | Performed by: STUDENT IN AN ORGANIZED HEALTH CARE EDUCATION/TRAINING PROGRAM

## 2023-04-21 PROCEDURE — 93010 ELECTROCARDIOGRAM REPORT: CPT | Mod: S$GLB,,, | Performed by: INTERNAL MEDICINE

## 2023-04-21 PROCEDURE — 99999 PR PBB SHADOW E&M-EST. PATIENT-LVL III: ICD-10-PCS | Mod: PBBFAC,,, | Performed by: STUDENT IN AN ORGANIZED HEALTH CARE EDUCATION/TRAINING PROGRAM

## 2023-04-21 PROCEDURE — 99214 PR OFFICE/OUTPT VISIT, EST, LEVL IV, 30-39 MIN: ICD-10-PCS | Mod: S$GLB,,, | Performed by: STUDENT IN AN ORGANIZED HEALTH CARE EDUCATION/TRAINING PROGRAM

## 2023-04-21 PROCEDURE — 3078F PR MOST RECENT DIASTOLIC BLOOD PRESSURE < 80 MM HG: ICD-10-PCS | Mod: CPTII,S$GLB,, | Performed by: STUDENT IN AN ORGANIZED HEALTH CARE EDUCATION/TRAINING PROGRAM

## 2023-04-21 NOTE — PROGRESS NOTES
"Electrophysiology Clinic Note    Reason for follow-up patient visit: Ongoing evaluation and recommendations regarding periods of SVT.    PRESENTING HISTORY:     History of Present Illness:  Mr. Aj Marte is a katrina 62-year-old gentleman who returns to clinic today for ongoing evaluation and recommendations regarding periods of SVT. He has a past medical history significant for a prior episode of SVT, baseline RBBB, ADHD, hyperlipidemia, hypertension, ALEENA maintained on CPAP therapy, and obesity.     He is followed in general cardiology with Dr. Higginbotham and was previously seen in clinic on 11/3/2022. He presented to the ED on 10/22/2022 with complaints of palpitations, noted to have racing heart rates, with HR >200 bpm. He tells me that at the time of this event, he was seated in his office, when he spontaneously started "feeling jittery" for about two hours. He reports feeling like his heart was racing, but denies any symptoms of wayne palpitations or irregular heart beats. When his symptoms did not remit, he presented to the ED for further evaluation. On ECG review from this encounter, he had an episode of SVT with RBBB at a rate of 209 bpm with inability to detect P waves. He was instructed to breathe forcefully into a syringe with spontaneous conversion to sinus tachycardia with RBBB. He was started on metoprolol succinate 25mg po daily, although reported that this medication was not effective at adequately controlling his palpitations. He has been monitoring his heart rates with a watch, and notes that he had one issue with increased heart rates. At our prior clinic visit, we discontinued his metoprolol succinate and initiated diltiazem 120mg po daily, with diltiazem 30mg po PRN palpitations. He feels that this regimen has resolved his palpitation events, and he has not had to take any of his PRN diltiazem. He recently underwent right knee surgery about two weeks ago and denies any complications with " this surgery. He has resumed physical therapy for his knee, and is able to walk with only slight pain presently. He has resumed his ADHD medications with no changes in his symptoms.      In discussion with Mr. Marte today, he tells me that he is feeling overall quite well. He denies any episodes of dizziness, lightheadedness, syncope/presyncope, chest pain or chest discomfort, nausea or vomiting, orthopnea, lower extremity edema, or PND. He denies any recurrent palpitations on his diltiazem regimen. He reports baseline mild shortness of breath and dyspnea with exertion that has remained stable for him. He could previously walk for about a mile without limitation, but following his recent knee surgery he has not resumed walking farther distances yet. 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 He has cut back on his caffeine consumption to 2 cups of coffee per day. He denies any recreational drug use, and has resumed taking his ADHD medications with no changes in his palpitation symptoms.      Review of Systems:  Review of Systems   Constitutional:  Negative for activity change.   HENT:  Negative for nasal congestion, nosebleeds, postnasal drip, rhinorrhea, sinus pressure/congestion, sneezing and sore throat.    Respiratory:  Positive for shortness of breath. Negative for apnea, cough, chest tightness and wheezing.    Cardiovascular:  Negative for chest pain, palpitations and leg swelling.        Previously reported an episode of racing heart rates; denies any further events while maintained on diltiazem.    Gastrointestinal:  Negative for abdominal distention, abdominal pain, blood in stool, change in bowel habit, constipation, diarrhea, nausea, vomiting and change in bowel habit.   Genitourinary:  Negative for dysuria and hematuria.   Musculoskeletal:  Positive for arthralgias, back pain and  "gait problem.        Bilateral shoulder and left knee replacement surgery. Recent right knee surgery.    Neurological:  Negative for dizziness, seizures, syncope, weakness, light-headedness, headaches, coordination difficulties and coordination difficulties.      PAST HISTORY:     Past Medical History:   Diagnosis Date    ADHD (attention deficit hyperactivity disorder)     Hyperlipidemia        Past Surgical History:   Procedure Laterality Date    KNEE SURGERY      ROTATOR CUFF REPAIR      bilateral    VASECTOMY         Family History:  Family History   Problem Relation Age of Onset    Dementia Mother     Mental illness Mother         dementia    Heart disease Father         CAD  at age 39    Mental illness Son         anxiety ADHD    Heart disease Brother         CAD age  38       Social History:  He  reports that he has never smoked. He has never used smokeless tobacco. He reports that he does not currently use alcohol. He reports that he does not use drugs.      MEDICATIONS & ALLERGIES:     Review of patient's allergies indicates:   Allergen Reactions    Opioids - morphine analogues Nausea And Vomiting       Current Outpatient Medications on File Prior to Visit   Medication Sig Dispense Refill    atorvastatin (LIPITOR) 40 MG tablet TAKE 1 TABLET DAILY 90 tablet 3    diltiaZEM (CARDIZEM) 30 MG tablet Take 1 tablet (30 mg total) by mouth 4 (four) times daily as needed (Palpititions). 120 tablet 11    diltiaZEM (DILACOR XR) 120 MG CDCR Take 1 capsule (120 mg total) by mouth once daily. 30 capsule 11    ibuprofen-diphenhydrAMINE cit (ADVIL PM) 200-38 mg Tab Take 1 tablet by mouth as needed.      ketoconazole (NIZORAL) 2 % shampoo Apply topically. Apply to scalp 2-3 times a week      MYDAYIS 25 mg CT24 Take 1 capsule by mouth once daily. 30 each 0     No current facility-administered medications on file prior to visit.        OBJECTIVE:     Vital Signs:  BP (!) 150/70   Pulse 67   Ht 5' 11" (1.803 m)   Wt " 134.9 kg (297 lb 6.4 oz)   BMI 41.48 kg/m²     Physical Exam:  Physical Exam  Constitutional:       General: He is not in acute distress.     Appearance: Normal appearance. He is obese. He is not ill-appearing or diaphoretic.      Comments: Well-appearing man in NAD.   HENT:      Head: Normocephalic and atraumatic.      Nose: Nose normal.      Mouth/Throat:      Mouth: Mucous membranes are moist.      Pharynx: Oropharynx is clear.   Eyes:      Pupils: Pupils are equal, round, and reactive to light.   Cardiovascular:      Rate and Rhythm: Normal rate. Rhythm irregular.      Pulses: Normal pulses.      Heart sounds: Normal heart sounds. No murmur heard.    No friction rub. No gallop.      Comments: Occasional PVCs appreciated on right radial artery palpation.   Pulmonary:      Effort: Pulmonary effort is normal. No respiratory distress.      Breath sounds: Normal breath sounds. No wheezing, rhonchi or rales.   Chest:      Chest wall: No tenderness.   Abdominal:      General: There is no distension.      Palpations: Abdomen is soft.      Tenderness: There is no abdominal tenderness.   Musculoskeletal:         General: No swelling or tenderness.      Cervical back: Normal range of motion.      Right lower leg: No edema.      Left lower leg: No edema.   Skin:     General: Skin is warm and dry.      Findings: No erythema, lesion or rash.   Neurological:      General: No focal deficit present.      Mental Status: He is alert and oriented to person, place, and time. Mental status is at baseline.      Motor: No weakness.      Gait: Gait normal.   Psychiatric:         Mood and Affect: Mood normal.         Behavior: Behavior normal.        Laboratory Data:  Lab Results   Component Value Date    WBC 6.58 03/07/2023    HGB 14.4 03/07/2023    HCT 44.6 03/07/2023    MCV 88 03/07/2023     03/07/2023     Lab Results   Component Value Date     03/07/2023     03/07/2023    K 4.6 03/07/2023     03/07/2023     CO2 28 03/07/2023    BUN 17 03/07/2023    CREATININE 1.0 03/07/2023    CALCIUM 9.5 03/07/2023    MG 1.7 10/21/2022     No results found for: INR, PROTIME    Pertinent Cardiac Data:  ECG: Normal sinus rhythm with sinus arrhythmia, blocked PAC, occasional PVCs, rate of 67 bpm,  ms, RBBB with  ms, QT/QTc 456/481 ms.     Resting 2D Transthoracic Echocardiogram - 10/22/2022:  The left ventricle is normal in size with concentric remodeling and normal systolic function.  The estimated ejection fraction is 55%.  Normal left ventricular diastolic function.  Mild right ventricular enlargement with normal right ventricular systolic function.  Mild to moderate left atrial enlargement.  Mild right atrial enlargement.  Intermediate central venous pressure (8 mmHg).  The estimated PA systolic pressure is 22 mmHg.      ASSESSMENT & PLAN:   Mr. Aj Marte is a katrina 62-year-old gentleman who returns to clinic today for ongoing evaluation and recommendations regarding periods of SVT. He has a past medical history significant for a prior episode of SVT, baseline RBBB, ADHD, hyperlipidemia, hypertension, ALEENA maintained on CPAP therapy, and obesity.     - We discussed the pathophysiology of supraventricular tachycardia, and the fact that SVT is an umbrella term that encompasses several different types of atrial arrhythmias. We reviewed the results of his prior presentation to the ED with an episode of SVT with a rate of approximately 210 bpm. This SVT may represent AVNRT, AVRT, or less likely, a JT. 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 A rudimentary diagram was drawn for the patient to assist in education.   - We discussed the concept of undergoing an EP study in order to better characterize his SVT and for possible definitive ablation. We discussed undergoing radiofrequency ablation with slow pathway modification. The  procedure itself, risks, benefits, and alternative options were discussed. As Mr. Marte reports that he has had one sustained episode, he would prefer to continue pharmacologic suppression prior to consideration of EPS and ablation.   - We reviewed the results of his recent echocardiogram revealing preserved systolic function with LVEF 55%.  - He will continue diltiazem 120mg po daily, with a short-acting diltiazem 30mg po PRN palpitations. He has not required any of his PRN diltiazem, and reports that he has not had any subsequent events of palpitations while on this regimen.   - We discussed abortive measures when he has an episode of palpitations, such as forced coughing, Valsalva maneuver, modified Valsalva maneuver with forced exhalation through a high resistance structure (such a drinking straw), unilateral carotid sinus massage, and ice/cold water application to the face and eyes. When he has an episode of recurrent palpitations, he was instructed to attempt physical abortive measures first. In the event this is ineffective, he was instructed to take a short-acting diltiazem and continue abortive maneuvers. Should his palpitations persist despite abortive maneuvers and short-acting diltiazem, he was instructed to present to the ED for possible adenosine administration or cardioversion.     This patient will return to clinic with me in six months for ongoing surveillance. In the event he has a subsequent event of sustained palpitations, he was instructed to contact my office for consideration for EPS and ablation. All questions and concerns were addressed at this encounter.     Signing Physician:       KB Schaefer MD  Electrophysiology Attending

## 2023-06-23 ENCOUNTER — PATIENT MESSAGE (OUTPATIENT)
Dept: INTERNAL MEDICINE | Facility: CLINIC | Age: 63
End: 2023-06-23
Payer: COMMERCIAL

## 2023-06-27 ENCOUNTER — PATIENT MESSAGE (OUTPATIENT)
Dept: INTERNAL MEDICINE | Facility: CLINIC | Age: 63
End: 2023-06-27
Payer: COMMERCIAL

## 2023-06-27 DIAGNOSIS — F90.9 ATTENTION DEFICIT HYPERACTIVITY DISORDER (ADHD), UNSPECIFIED ADHD TYPE: Primary | ICD-10-CM

## 2023-06-27 RX ORDER — LISDEXAMFETAMINE DIMESYLATE 30 MG/1
30 CAPSULE ORAL EVERY MORNING
Qty: 90 CAPSULE | Refills: 0 | Status: SHIPPED | OUTPATIENT
Start: 2023-06-27 | End: 2023-07-05 | Stop reason: SDUPTHER

## 2023-07-05 DIAGNOSIS — F90.9 ATTENTION DEFICIT HYPERACTIVITY DISORDER (ADHD), UNSPECIFIED ADHD TYPE: ICD-10-CM

## 2023-07-05 RX ORDER — LISDEXAMFETAMINE DIMESYLATE 30 MG/1
30 CAPSULE ORAL EVERY MORNING
Qty: 90 CAPSULE | Refills: 0 | Status: SHIPPED | OUTPATIENT
Start: 2023-07-05 | End: 2023-07-13 | Stop reason: SDUPTHER

## 2023-07-05 NOTE — TELEPHONE ENCOUNTER
No care due was identified.  [facility] Embedded Care Due Messages. Reference number: [id number].   7/05/2023 10:24:40 AM CDT

## 2023-07-13 ENCOUNTER — PATIENT MESSAGE (OUTPATIENT)
Dept: INTERNAL MEDICINE | Facility: CLINIC | Age: 63
End: 2023-07-13
Payer: COMMERCIAL

## 2023-07-13 DIAGNOSIS — F90.9 ATTENTION DEFICIT HYPERACTIVITY DISORDER (ADHD), UNSPECIFIED ADHD TYPE: ICD-10-CM

## 2023-07-13 NOTE — TELEPHONE ENCOUNTER
No care due was identified.  [facility] Embedded Care Due Messages. Reference number: [id number].   7/13/2023 10:19:17 AM CDT

## 2023-07-19 RX ORDER — LISDEXAMFETAMINE DIMESYLATE 30 MG/1
30 CAPSULE ORAL EVERY MORNING
Qty: 90 CAPSULE | Refills: 0 | Status: SHIPPED | OUTPATIENT
Start: 2023-07-19 | End: 2023-08-14 | Stop reason: SDUPTHER

## 2023-07-25 ENCOUNTER — TELEPHONE (OUTPATIENT)
Dept: PHARMACY | Facility: CLINIC | Age: 63
End: 2023-07-25
Payer: COMMERCIAL

## 2023-08-14 ENCOUNTER — OFFICE VISIT (OUTPATIENT)
Dept: INTERNAL MEDICINE | Facility: CLINIC | Age: 63
End: 2023-08-14
Payer: COMMERCIAL

## 2023-08-14 DIAGNOSIS — C44.91 SKIN CANCER, BASAL CELL: ICD-10-CM

## 2023-08-14 DIAGNOSIS — G47.33 OSA ON CPAP: ICD-10-CM

## 2023-08-14 DIAGNOSIS — E78.5 DYSLIPIDEMIA: ICD-10-CM

## 2023-08-14 DIAGNOSIS — F90.9 ATTENTION DEFICIT HYPERACTIVITY DISORDER (ADHD), UNSPECIFIED ADHD TYPE: Primary | ICD-10-CM

## 2023-08-14 DIAGNOSIS — I47.10 SVT (SUPRAVENTRICULAR TACHYCARDIA): ICD-10-CM

## 2023-08-14 PROCEDURE — 1159F MED LIST DOCD IN RCRD: CPT | Mod: CPTII,95,, | Performed by: INTERNAL MEDICINE

## 2023-08-14 PROCEDURE — 1160F RVW MEDS BY RX/DR IN RCRD: CPT | Mod: CPTII,95,, | Performed by: INTERNAL MEDICINE

## 2023-08-14 PROCEDURE — 99214 PR OFFICE/OUTPT VISIT, EST, LEVL IV, 30-39 MIN: ICD-10-PCS | Mod: 95,,, | Performed by: INTERNAL MEDICINE

## 2023-08-14 PROCEDURE — 1160F PR REVIEW ALL MEDS BY PRESCRIBER/CLIN PHARMACIST DOCUMENTED: ICD-10-PCS | Mod: CPTII,95,, | Performed by: INTERNAL MEDICINE

## 2023-08-14 PROCEDURE — 1159F PR MEDICATION LIST DOCUMENTED IN MEDICAL RECORD: ICD-10-PCS | Mod: CPTII,95,, | Performed by: INTERNAL MEDICINE

## 2023-08-14 PROCEDURE — 99214 OFFICE O/P EST MOD 30 MIN: CPT | Mod: 95,,, | Performed by: INTERNAL MEDICINE

## 2023-08-14 RX ORDER — LISDEXAMFETAMINE DIMESYLATE 30 MG/1
30 CAPSULE ORAL EVERY MORNING
Qty: 90 CAPSULE | Refills: 0 | Status: SHIPPED | OUTPATIENT
Start: 2023-08-14

## 2023-08-17 NOTE — PROGRESS NOTES
Subjective     Patient ID: Aj Marte is a 63 y.o. male.    Chief Complaint:  Follow-up    HPI 63-year-old male presents to clinic today for follow-up compliant with medication for ADD doing well overall without complaints no side effects Review of Systems  Otherwise negative     Objective General: Well-appearing, well-nourished.  No distress  HEENT: conjunctivae are normal.   Hearing is grossly normal.  Nasopharynx robbin congestion  Oropharynx is clear.  Neck: Supple.  Visually No thyroid megaly.   Heart: Regular rate   Lungs:  respiratory effort normal.  Abdomen:  nontender  Extremities:   No edema.  Psych: Oriented to time person place.  Judgment and insight seem unimpaired.  Mood and affect are appropriate.    Physical Exam       Assessment and Plan     1. Attention deficit hyperactivity disorder (ADHD), unspecified ADHD type  Overview:  No issues to continue treatment per EP cards despite SVT    Orders:  -     lisdexamfetamine (VYVANSE) 30 MG capsule; Take 1 capsule (30 mg total) by mouth every morning.  Dispense: 90 capsule; Refill: 0    2. Dyslipidemia    3. SVT (supraventricular tachycardia)    4. Skin cancer, basal cell  Overview:  Dr Kenzie Morrison derm       5. ALEENA on CPAP  -     Ambulatory referral/consult to Sleep Disorders; Future; Expected date: 08/21/2023        Diagnoses and all orders for this visit:    Attention deficit hyperactivity disorder (ADHD), unspecified ADHD type  -     lisdexamfetamine (VYVANSE) 30 MG capsule; Take 1 capsule (30 mg total) by mouth every morning.  Controlled.  Continue current medical regimen.  Prescription refills addressed.  Followup advised. See after visit summary.  Dyslipidemia  Controlled.  Continue current medical regimen.  Prescription refills addressed.  Followup advised. See after visit summary.  SVT (supraventricular tachycardia)  Followed by cardiology denies any significant symptoms  Skin cancer, basal cell  History of resection and followed by  Dermatology  ALEENA on CPAP  -     Ambulatory referral/consult to Sleep Disorders; Future  Patient is due for follow-up equipment checked in ensure optimal control    The [place]:  [place]  The chief complaint leading to consultation is:  ADD hypertension dyslipidemia sleep apnea    Visit type: audiovisual    Face to Face time with patient:  20  Twenty-five minutes of total time spent on the encounter, which includes face to face time and non-face to face time preparing to see the patient (eg, review of tests), Obtaining and/or reviewing separately obtained history, Documenting clinical information in the electronic or other health record, Independently interpreting results (not separately reported) and communicating results to the patient/family/caregiver, or Care coordination (not separately reported).         Each patient to whom he or she provides medical services by telemedicine is:  (1) informed of the relationship between the physician and patient and the respective role of any other health care provider with respect to management of the patient; and (2) notified that he or she may decline to receive medical services by telemedicine and may withdraw from such care at any time.    Notes:               Follow up in about 4 months (around 12/14/2023) for virtualShonda

## 2023-10-16 RX ORDER — DILTIAZEM HYDROCHLORIDE 120 MG/1
120 CAPSULE, EXTENDED RELEASE ORAL
Qty: 90 CAPSULE | Refills: 3 | Status: SHIPPED | OUTPATIENT
Start: 2023-10-16

## 2023-10-23 ENCOUNTER — PATIENT MESSAGE (OUTPATIENT)
Dept: ADMINISTRATIVE | Facility: HOSPITAL | Age: 63
End: 2023-10-23
Payer: COMMERCIAL

## 2023-10-26 ENCOUNTER — PATIENT MESSAGE (OUTPATIENT)
Dept: ADMINISTRATIVE | Facility: HOSPITAL | Age: 63
End: 2023-10-26
Payer: COMMERCIAL

## 2023-12-15 ENCOUNTER — OFFICE VISIT (OUTPATIENT)
Dept: INTERNAL MEDICINE | Facility: CLINIC | Age: 63
End: 2023-12-15
Payer: COMMERCIAL

## 2023-12-15 VITALS
WEIGHT: 289 LBS | BODY MASS INDEX: 40.31 KG/M2 | HEART RATE: 66 BPM | DIASTOLIC BLOOD PRESSURE: 70 MMHG | SYSTOLIC BLOOD PRESSURE: 130 MMHG

## 2023-12-15 DIAGNOSIS — Z00.00 PREVENTATIVE HEALTH CARE: ICD-10-CM

## 2023-12-15 DIAGNOSIS — E78.5 DYSLIPIDEMIA: Primary | ICD-10-CM

## 2023-12-15 DIAGNOSIS — F90.9 ATTENTION DEFICIT HYPERACTIVITY DISORDER (ADHD), UNSPECIFIED ADHD TYPE: ICD-10-CM

## 2023-12-15 DIAGNOSIS — E66.01 CLASS 3 SEVERE OBESITY DUE TO EXCESS CALORIES WITH SERIOUS COMORBIDITY AND BODY MASS INDEX (BMI) OF 40.0 TO 44.9 IN ADULT: ICD-10-CM

## 2023-12-15 DIAGNOSIS — I47.10 SVT (SUPRAVENTRICULAR TACHYCARDIA): ICD-10-CM

## 2023-12-15 DIAGNOSIS — G47.33 OSA ON CPAP: ICD-10-CM

## 2023-12-15 PROCEDURE — 3008F PR BODY MASS INDEX (BMI) DOCUMENTED: ICD-10-PCS | Mod: CPTII,95,, | Performed by: INTERNAL MEDICINE

## 2023-12-15 PROCEDURE — 3078F PR MOST RECENT DIASTOLIC BLOOD PRESSURE < 80 MM HG: ICD-10-PCS | Mod: CPTII,95,, | Performed by: INTERNAL MEDICINE

## 2023-12-15 PROCEDURE — 3008F BODY MASS INDEX DOCD: CPT | Mod: CPTII,95,, | Performed by: INTERNAL MEDICINE

## 2023-12-15 PROCEDURE — 99214 PR OFFICE/OUTPT VISIT, EST, LEVL IV, 30-39 MIN: ICD-10-PCS | Mod: 95,,, | Performed by: INTERNAL MEDICINE

## 2023-12-15 PROCEDURE — 1159F PR MEDICATION LIST DOCUMENTED IN MEDICAL RECORD: ICD-10-PCS | Mod: CPTII,95,, | Performed by: INTERNAL MEDICINE

## 2023-12-15 PROCEDURE — 3075F SYST BP GE 130 - 139MM HG: CPT | Mod: CPTII,95,, | Performed by: INTERNAL MEDICINE

## 2023-12-15 PROCEDURE — 3075F PR MOST RECENT SYSTOLIC BLOOD PRESS GE 130-139MM HG: ICD-10-PCS | Mod: CPTII,95,, | Performed by: INTERNAL MEDICINE

## 2023-12-15 PROCEDURE — 99214 OFFICE O/P EST MOD 30 MIN: CPT | Mod: 95,,, | Performed by: INTERNAL MEDICINE

## 2023-12-15 PROCEDURE — 1160F RVW MEDS BY RX/DR IN RCRD: CPT | Mod: CPTII,95,, | Performed by: INTERNAL MEDICINE

## 2023-12-15 PROCEDURE — 1159F MED LIST DOCD IN RCRD: CPT | Mod: CPTII,95,, | Performed by: INTERNAL MEDICINE

## 2023-12-15 PROCEDURE — 3078F DIAST BP <80 MM HG: CPT | Mod: CPTII,95,, | Performed by: INTERNAL MEDICINE

## 2023-12-15 PROCEDURE — 1160F PR REVIEW ALL MEDS BY PRESCRIBER/CLIN PHARMACIST DOCUMENTED: ICD-10-PCS | Mod: CPTII,95,, | Performed by: INTERNAL MEDICINE

## 2023-12-15 NOTE — PROGRESS NOTES
Subjective     Patient ID: Aj Marte is a 63 y.o. male.    Chief Complaint: No chief complaint on file.    HPI 63-year-old male presents to clinic today follow-up dyslipidemia ADHD SVT patient reports that his rates been controlled he is without any acute complaints does not need any significant issues and does not need refills at this time.  Review of Systems  Otherwise negative     Objective     Physical Exam  General: Well-appearing, well-nourished.  No distress  HEENT: conjunctivae are normal.   Hearing is grossly normal.  Nasopharynx robbin congestion  Oropharynx is clear.  Neck: Supple.  Visually No thyroid megaly.   Heart: Regular rate   Lungs:  respiratory effort normal.  Abdomen:  nontender  Extremities:   No edema.  Psych: Oriented to time person place.  Judgment and insight seem unimpaired.  Mood and affect are appropriate.     Assessment and Plan     1. Dyslipidemia    2. Attention deficit hyperactivity disorder (ADHD), unspecified ADHD type  Overview:  No issues to continue treatment per EP cards despite SVT      3. SVT (supraventricular tachycardia)    4. ALEENA on CPAP    5. Class 3 severe obesity due to excess calories with serious comorbidity and body mass index (BMI) of 40.0 to 44.9 in adult    6. Preventative health care  -     CBC Auto Differential; Future  -     Comprehensive Metabolic Panel; Future  -     TSH; Future; Expected date: 12/15/2023  -     Lipid Panel; Future; Expected date: 12/15/2023  -     PSA, Screening; Future; Expected date: 12/15/2023        Diagnoses and all orders for this visit:    Dyslipidemia  Controlled.  Continue current medical regimen.  Prescription refills addressed.  Followup advised. See after visit summary.  Attention deficit hyperactivity disorder (ADHD), unspecified ADHD type  Controlled.  Continue current medical regimen.  Prescription refills addressed.  Followup advised. See after visit summary.  SVT (supraventricular tachycardia)  Rate controlled monitors  with an Apple watch  ALEENA on CPAP    Class 3 severe obesity due to excess calories with serious comorbidity and body mass index (BMI) of 40.0 to 44.9 in adult  Counseling provided  Preventative health care  -     CBC Auto Differential; Future  -     Comprehensive Metabolic Panel; Future  -     TSH; Future  -     Lipid Panel; Future  -     PSA, Screening; Future  Scheduled    The patient location is:  [facility]  The chief complaint leading to consultation is:  Dyslipidemia ADHD history of SVT    Visit type: audiovisual    Face to Face time with patient:  20  20 minutes of total time spent on the encounter, which includes face to face time and non-face to face time preparing to see the patient (eg, review of tests), Obtaining and/or reviewing separately obtained history, Documenting clinical information in the electronic or other health record, Independently interpreting results (not separately reported) and communicating results to the patient/family/caregiver, or Care coordination (not separately reported).         Each patient to whom he or she provides medical services by telemedicine is:  (1) informed of the relationship between the physician and patient and the respective role of any other health care provider with respect to management of the patient; and (2) notified that he or she may decline to receive medical services by telemedicine and may withdraw from such care at any time.    Notes:               Follow up in about 6 months (around 6/15/2024).

## 2024-05-13 ENCOUNTER — HOSPITAL ENCOUNTER (OUTPATIENT)
Dept: CARDIOLOGY | Facility: CLINIC | Age: 64
Discharge: HOME | End: 2024-05-13
Payer: COMMERCIAL

## 2024-05-13 ENCOUNTER — OFFICE VISIT (OUTPATIENT)
Dept: ELECTROPHYSIOLOGY | Facility: CLINIC | Age: 64
End: 2024-05-13
Payer: COMMERCIAL

## 2024-05-13 VITALS
SYSTOLIC BLOOD PRESSURE: 173 MMHG | HEART RATE: 75 BPM | DIASTOLIC BLOOD PRESSURE: 91 MMHG | HEIGHT: 71 IN | WEIGHT: 297.38 LBS | BODY MASS INDEX: 41.63 KG/M2

## 2024-05-13 DIAGNOSIS — E78.2 MIXED HYPERLIPIDEMIA: ICD-10-CM

## 2024-05-13 DIAGNOSIS — G47.33 OSA ON CPAP: ICD-10-CM

## 2024-05-13 DIAGNOSIS — I10 PRIMARY HYPERTENSION: ICD-10-CM

## 2024-05-13 DIAGNOSIS — I25.10 CORONARY ARTERY DISEASE INVOLVING NATIVE CORONARY ARTERY OF NATIVE HEART WITHOUT ANGINA PECTORIS: ICD-10-CM

## 2024-05-13 DIAGNOSIS — I49.8 OTHER CARDIAC ARRHYTHMIA: ICD-10-CM

## 2024-05-13 DIAGNOSIS — I47.10 SVT (SUPRAVENTRICULAR TACHYCARDIA): Primary | ICD-10-CM

## 2024-05-13 DIAGNOSIS — M15.9 OSTEOARTHRITIS OF MULTIPLE JOINTS, UNSPECIFIED OSTEOARTHRITIS TYPE: ICD-10-CM

## 2024-05-13 DIAGNOSIS — F90.2 ATTENTION DEFICIT HYPERACTIVITY DISORDER (ADHD), COMBINED TYPE: ICD-10-CM

## 2024-05-13 DIAGNOSIS — E66.01 CLASS 3 SEVERE OBESITY DUE TO EXCESS CALORIES WITH SERIOUS COMORBIDITY AND BODY MASS INDEX (BMI) OF 40.0 TO 44.9 IN ADULT: ICD-10-CM

## 2024-05-13 DIAGNOSIS — Z95.5 HISTORY OF CORONARY ANGIOPLASTY WITH INSERTION OF STENT: ICD-10-CM

## 2024-05-13 DIAGNOSIS — I45.10 RBBB: ICD-10-CM

## 2024-05-13 LAB
OHS QRS DURATION: 160 MS
OHS QTC CALCULATION: 484 MS

## 2024-05-13 PROCEDURE — 1159F MED LIST DOCD IN RCRD: CPT | Mod: CPTII,S$GLB,, | Performed by: STUDENT IN AN ORGANIZED HEALTH CARE EDUCATION/TRAINING PROGRAM

## 2024-05-13 PROCEDURE — 99999 PR PBB SHADOW E&M-EST. PATIENT-LVL III: CPT | Mod: PBBFAC,,, | Performed by: STUDENT IN AN ORGANIZED HEALTH CARE EDUCATION/TRAINING PROGRAM

## 2024-05-13 PROCEDURE — 93010 ELECTROCARDIOGRAM REPORT: CPT | Mod: S$GLB,,, | Performed by: INTERNAL MEDICINE

## 2024-05-13 PROCEDURE — 3080F DIAST BP >= 90 MM HG: CPT | Mod: CPTII,S$GLB,, | Performed by: STUDENT IN AN ORGANIZED HEALTH CARE EDUCATION/TRAINING PROGRAM

## 2024-05-13 PROCEDURE — 99214 OFFICE O/P EST MOD 30 MIN: CPT | Mod: S$GLB,,, | Performed by: STUDENT IN AN ORGANIZED HEALTH CARE EDUCATION/TRAINING PROGRAM

## 2024-05-13 PROCEDURE — 3077F SYST BP >= 140 MM HG: CPT | Mod: CPTII,S$GLB,, | Performed by: STUDENT IN AN ORGANIZED HEALTH CARE EDUCATION/TRAINING PROGRAM

## 2024-05-13 PROCEDURE — 3008F BODY MASS INDEX DOCD: CPT | Mod: CPTII,S$GLB,, | Performed by: STUDENT IN AN ORGANIZED HEALTH CARE EDUCATION/TRAINING PROGRAM

## 2024-05-13 PROCEDURE — 93005 ELECTROCARDIOGRAM TRACING: CPT | Mod: S$GLB,,, | Performed by: STUDENT IN AN ORGANIZED HEALTH CARE EDUCATION/TRAINING PROGRAM

## 2024-05-13 RX ORDER — EVOLOCUMAB 140 MG/ML
140 INJECTION, SOLUTION SUBCUTANEOUS
COMMUNITY
Start: 2024-05-10

## 2024-05-13 RX ORDER — HYDROCHLOROTHIAZIDE 25 MG/1
25 TABLET ORAL DAILY
COMMUNITY

## 2024-05-13 RX ORDER — CLOPIDOGREL BISULFATE 75 MG/1
75 TABLET ORAL DAILY
COMMUNITY
Start: 2024-03-15

## 2024-05-13 RX ORDER — ASPIRIN 81 MG/1
81 TABLET ORAL
COMMUNITY
Start: 2024-03-11

## 2024-05-13 RX ORDER — EZETIMIBE 10 MG/1
10 TABLET ORAL DAILY
COMMUNITY
Start: 2024-03-27

## 2024-05-13 NOTE — PROGRESS NOTES
"Electrophysiology Clinic Note    Reason for follow-up patient visit: Ongoing evaluation and recommendations regarding periods of SVT.    PRESENTING HISTORY:     History of Present Illness:  Mr. Aj Marte is a katrina 63-year-old gentleman who returns to clinic today for ongoing evaluation and recommendations regarding periods of SVT. He has a past medical history significant for a prior episode of SVT, baseline RBBB, coronary artery disease with a recent angiogram and PCI of the mid LAD on 3/15/2024, ADHD, hyperlipidemia, hypertension, ALEENA maintained on CPAP therapy, and obesity. He recently had a pharmacologic nuclear cardiac stress test that was concerning for possible ischemia, and underwent a successful angiogram revealing a 70% stenosis of the mid LAD, s/p placement of a JAMEY x1 with Dr. Rom Quiñones at [facility] on 3/15/2024.     He was previously followed in general cardiology with Dr. Higginbotham, and has since transitioned his general cardiology care to Dr. Rom Quiñones after Dr. Higginbotham relocated his practice. He was previously seen in clinic on 11/3/2022. He presented to the ED on 10/22/2022 with complaints of palpitations, noted to have racing heart rates, with HR >200 bpm. He tells me that at the time of this event, he was seated in his office, when he spontaneously started "feeling jittery" for about two hours. He reports feeling like his heart was racing, but denies any symptoms of wayne palpitations or irregular heart beats. When his symptoms did not remit, he presented to the ED for further evaluation. On ECG review from this encounter, he had an episode of SVT with RBBB at a rate of 209 bpm with inability to detect P waves. He was instructed to breathe forcefully into a syringe with spontaneous conversion to sinus tachycardia with RBBB. He was started on metoprolol succinate 25mg po daily, although reported that this medication was not effective at adequately controlling his palpitations. " He has been monitoring his heart rates with a watch, and notes that he had one issue with increased heart rates. At our prior clinic visit, we discontinued his metoprolol succinate and initiated diltiazem 120mg po daily, with diltiazem 30mg po PRN palpitations. He feels that this regimen has resolved his palpitation events, and he has only had to take his PRN diltiazem twice since this event. He subsequently underwent a right knee surgery and denies any complications with this surgery. He has completed physical therapy for his knee, and is able to walk with no pain at present. He has resumed his ADHD medications with no changes in his symptoms. Unfortunately, Dr. Higginbotham relocated his practice, and he has since transitioned his cardiology care to Dr. Quiñones at [facility] in [place]. He underwent a pharmacologic nuclear cardiac stress test that was concerning for possible ischemia. Given his very strong family history of early coronary artery disease and his positive stress test, he underwent a successful angiogram revealing a 70% stenosis of the mid LAD, s/p placement of a JAMEY x1 with Dr. Rom Quiñones at [facility] on 3/15/2024. He has since been initiated on DAPT with aspirin and Plavix and denies any chest pain, changes to his baseline mild shortness of breath, or subsequent palpitations. He has a Whoop wearable device that has captured periods of sinus tachycardia and sinus bradycardia, with no arrhythmia recorded.      In discussion with Mr. Marte today, he tells me that he is feeling overall quite well. He denies any episodes of dizziness, lightheadedness, syncope/presyncope, chest pain or chest discomfort, nausea or vomiting, orthopnea, lower extremity edema, or PND. He denies any recurrent sustained palpitations on his diltiazem regimen, although he occasionally reports brief palpitations lasting a matter of seconds to a minute. He reports baseline mild shortness of breath and dyspnea with exertion that has remained  stable for him. He could previously walk for about a mile without limitation, but following his right knee surgery he has not resumed walking farther distances yet. 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 He has cut back on his caffeine consumption to 2 cups of coffee per day. He denies any recreational drug use, and has resumed taking his ADHD medications with no changes in his palpitation symptoms.      Review of Systems:  Review of Systems   Constitutional:  Negative for activity change.   HENT:  Negative for nasal congestion, nosebleeds, postnasal drip, rhinorrhea, sinus pressure/congestion, sneezing and sore throat.    Respiratory:  Positive for shortness of breath. Negative for apnea, cough, chest tightness and wheezing.    Cardiovascular:  Positive for palpitations. Negative for chest pain and leg swelling.        Previously reported episodes of racing heart rates; denies any further events while maintained on diltiazem.    Gastrointestinal:  Negative for abdominal distention, abdominal pain, blood in stool, change in bowel habit, constipation, diarrhea, nausea and vomiting.   Genitourinary:  Negative for dysuria and hematuria.   Musculoskeletal:  Positive for arthralgias and back pain. Negative for gait problem.        Bilateral shoulder and bilateral knee replacement surgeries.    Neurological:  Negative for dizziness, seizures, syncope, weakness, light-headedness, headaches and coordination difficulties.        PAST HISTORY:     Past Medical History:   Diagnosis Date    ADHD (attention deficit hyperactivity disorder)     Hyperlipidemia        Past Surgical History:   Procedure Laterality Date    KNEE SURGERY      ROTATOR CUFF REPAIR      bilateral    VASECTOMY         Family History:  Family History   Problem Relation Name Age of Onset    Dementia Mother      Mental illness Mother    "       dementia    Heart disease Father          CAD  at age 39    Mental illness Son 1         anxiety ADHD    Heart disease Brother          CAD age  38       Social History:  He  reports that he has never smoked. He has never used smokeless tobacco. He reports that he does not currently use alcohol. He reports that he does not use drugs.      MEDICATIONS & ALLERGIES:     Review of patient's allergies indicates:   Allergen Reactions    Opioids - morphine analogues Nausea And Vomiting       Current Outpatient Medications on File Prior to Visit   Medication Sig Dispense Refill    atorvastatin (LIPITOR) 40 MG tablet TAKE 1 TABLET DAILY 90 tablet 3    diltiaZEM (DILACOR XR) 120 MG CDCR TAKE 1 CAPSULE BY MOUTH ONCE DAILY 90 capsule 3    ibuprofen-diphenhydrAMINE cit (ADVIL PM) 200-38 mg Tab Take 1 tablet by mouth as needed.      ketoconazole (NIZORAL) 2 % shampoo Apply topically. Apply to scalp 2-3 times a week      lisdexamfetamine (VYVANSE) 30 MG capsule Take 1 capsule (30 mg total) by mouth every morning. 90 capsule 0     No current facility-administered medications on file prior to visit.        OBJECTIVE:     Vital Signs:  BP (!) 173/91   Pulse 75   Ht 5' 11" (1.803 m)   Wt 134.9 kg (297 lb 6.4 oz)   BMI 41.48 kg/m²     Physical Exam:  Physical Exam  Constitutional:       General: He is not in acute distress.     Appearance: Normal appearance. He is obese. He is not ill-appearing or diaphoretic.      Comments: Well-appearing man in NAD.   HENT:      Head: Normocephalic and atraumatic.      Nose: Nose normal.      Mouth/Throat:      Mouth: Mucous membranes are moist.      Pharynx: Oropharynx is clear.   Eyes:      Pupils: Pupils are equal, round, and reactive to light.   Cardiovascular:      Rate and Rhythm: Normal rate and regular rhythm.      Pulses: Normal pulses.      Heart sounds: Normal heart sounds. No murmur heard.     No friction rub. No gallop.   Pulmonary:      Effort: Pulmonary effort is " "normal. No respiratory distress.      Breath sounds: Normal breath sounds. No wheezing, rhonchi or rales.   Chest:      Chest wall: No tenderness.   Abdominal:      General: There is no distension.      Palpations: Abdomen is soft.      Tenderness: There is no abdominal tenderness.   Musculoskeletal:         General: No swelling or tenderness.      Cervical back: Normal range of motion.      Right lower leg: No edema.      Left lower leg: No edema.   Skin:     General: Skin is warm and dry.      Findings: No erythema, lesion or rash.   Neurological:      General: No focal deficit present.      Mental Status: He is alert and oriented to person, place, and time. Mental status is at baseline.      Motor: No weakness.      Gait: Gait normal.   Psychiatric:         Mood and Affect: Mood normal.         Behavior: Behavior normal.          Laboratory Data:  Lab Results   Component Value Date    WBC 6.58 03/07/2023    HGB 14.4 03/07/2023    HCT 44.6 03/07/2023    MCV 88 03/07/2023     03/07/2023     Lab Results   Component Value Date     03/07/2023     03/07/2023    K 4.6 03/07/2023     03/07/2023    CO2 28 03/07/2023    BUN 17 03/07/2023    CREATININE 1.0 03/07/2023    CALCIUM 9.5 03/07/2023    MG 1.7 10/21/2022     No results found for: "INR", "PROTIME"    Pertinent Cardiac Data:  ECG: Normal sinus rhythm with rate of 75 bpm,  ms, RBBB with  ms, QT/QTc 434/484 ms, inferior Q waves noted, nonspecific STT changes.     Resting 2D Transthoracic Echocardiogram - 10/22/2022:  The left ventricle is normal in size with concentric remodeling and normal systolic function.  The estimated ejection fraction is 55%.  Normal left ventricular diastolic function.  Mild right ventricular enlargement with normal right ventricular systolic function.  Mild to moderate left atrial enlargement.  Mild right atrial enlargement.  Intermediate central venous pressure (8 mmHg).  The estimated PA systolic " pressure is 22 mmHg.      ASSESSMENT & PLAN:   Mr. Aj Marte is a katrina 63-year-old gentleman who returns to clinic today for ongoing evaluation and recommendations regarding periods of SVT. He has a past medical history significant for a prior episode of SVT, baseline RBBB, coronary artery disease with a recent angiogram and PCI of the mid LAD on 3/15/2024, ADHD, hyperlipidemia, hypertension, ALEENA maintained on CPAP therapy, and obesity. He recently had a pharmacologic nuclear cardiac stress test that was concerning for possible ischemia, and underwent a successful angiogram revealing a 70% stenosis of the mid LAD, s/p placement of a JAMEY x1 with Dr. Rom Quiñones at [facility] on 3/15/2024.     - We discussed the pathophysiology of supraventricular tachycardia, and the fact that SVT is an umbrella term that encompasses several different types of atrial arrhythmias. We reviewed the results of his prior presentation to the ED with an episode of SVT with a rate of approximately 210 bpm. This SVT may represent AVNRT, AVRT, or less likely, a JT. 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 A rudimentary diagram was drawn for the patient to assist in education.   - We discussed the concept of undergoing an EP study in order to better characterize his SVT and for possible definitive ablation. We discussed undergoing radiofrequency ablation with slow pathway modification. The procedure itself, risks, benefits, and alternative options were discussed. As Mr. Marte reports that he has had one sustained episode, he would prefer to continue pharmacologic suppression prior to consideration of EPS and ablation.   - We reviewed the results of his recent echocardiogram revealing preserved systolic function with LVEF 55%.  - He will continue diltiazem 120mg po daily, with a short-acting diltiazem 30mg po PRN palpitations. He has not required  frequent use of his PRN diltiazem, and reports that he has not had any subsequent events of sustained palpitations while on this regimen. He is monitoring his rhythm at home with a Whoop wearable monitor and has not recorded any arrhythmia. He recently completed an unremarkable ambulatory event monitor with Dr. Quiñones, in addition to a repeat echocardiogram. We will obtain these outside records. His angiogram report was scanned into his medical record for review.   - We discussed abortive measures when he has an episode of palpitations, such as forced coughing, Valsalva maneuver, modified Valsalva maneuver with forced exhalation through a high resistance structure (such a drinking straw), unilateral carotid sinus massage, and ice/cold water application to the face and eyes. When he has an episode of recurrent palpitations, he was instructed to attempt physical abortive measures first. In the event this is ineffective, he was instructed to take a short-acting diltiazem and continue abortive maneuvers. Should his palpitations persist despite abortive maneuvers and short-acting diltiazem, he was instructed to present to the ED for possible adenosine administration or cardioversion.   - He will continue to closely follow with Dr. Quiñones for ongoing management of his coronary artery disease with continued DAPT for at least one year. He was discontinued on statin therapy secondary to myalgia and is maintained on Zetia 10mg po daily and Repatha with a repeat lipid profile to be obtained with Dr. Quiñones next month. His ambulatory blood pressure measurements remain at goal per his report on continued diltiazem 120mg po daily and HCTZ 25mg po daily.     This patient will return to clinic with me in six months for ongoing surveillance. In the event he has a subsequent event of sustained palpitations, he was instructed to contact my office for consideration for EPS and ablation. All questions and concerns were addressed at this  encounter.     Signing Physician:       KB Schaefer MD  Electrophysiology Attending

## 2024-05-18 DIAGNOSIS — F90.9 ATTENTION DEFICIT HYPERACTIVITY DISORDER (ADHD), UNSPECIFIED ADHD TYPE: ICD-10-CM

## 2024-05-18 NOTE — TELEPHONE ENCOUNTER
No care due was identified.  [facility] Embedded Care Due Messages. Reference number: [id number].   5/18/2024 11:28:34 AM CDT

## 2024-05-21 DIAGNOSIS — F90.9 ATTENTION DEFICIT HYPERACTIVITY DISORDER (ADHD), UNSPECIFIED ADHD TYPE: ICD-10-CM

## 2024-05-21 NOTE — TELEPHONE ENCOUNTER
No care due was identified.  [facility] Embedded Care Due Messages. Reference number: [id number].   5/21/2024 7:10:18 AM CDT

## 2024-05-21 NOTE — TELEPHONE ENCOUNTER
Refill Routing Note   Medication(s) are not appropriate for processing by [facility] for the following reason(s):        Outside of protocol    [facility] action(s):  Route  Quick Discontinue        Medication Therapy Plan: duplicate encounter; refill center cannot QDC controlled substances      Appointments  past 12m or future 3m with PCP    Date Provider   Last Visit   12/15/2023 Alecia Mcghee MD   Next Visit   6/17/2024 Alecia Mcghee MD   ED visits in past 90 days: 0        Note composed:7:23 AM 05/21/2024

## 2024-05-21 NOTE — TELEPHONE ENCOUNTER
Refill Routing Note   Medication(s) are not appropriate for processing by [facility] for the following reason(s):        Outside of protocol    [facility] action(s):  Route               Appointments  past 12m or future 3m with PCP    Date Provider   Last Visit   12/15/2023 Alecia Mcghee MD   Next Visit   5/21/2024 Alecia Mcghee MD   ED visits in past 90 days: 0        Note composed:7:23 AM 05/21/2024

## 2024-05-22 RX ORDER — LISDEXAMFETAMINE DIMESYLATE 30 MG/1
30 CAPSULE ORAL EVERY MORNING
Qty: 90 CAPSULE | Refills: 0 | OUTPATIENT
Start: 2024-05-22

## 2024-05-22 RX ORDER — LISDEXAMFETAMINE DIMESYLATE 30 MG/1
30 CAPSULE ORAL EVERY MORNING
Qty: 90 CAPSULE | Refills: 0 | Status: SHIPPED | OUTPATIENT
Start: 2024-05-22

## 2024-05-28 ENCOUNTER — PATIENT OUTREACH (OUTPATIENT)
Dept: ADMINISTRATIVE | Facility: HOSPITAL | Age: 64
End: 2024-05-28
Payer: COMMERCIAL

## 2024-05-28 ENCOUNTER — PATIENT MESSAGE (OUTPATIENT)
Dept: ADMINISTRATIVE | Facility: HOSPITAL | Age: 64
End: 2024-05-28
Payer: COMMERCIAL

## 2024-06-04 NOTE — PATIENT INSTRUCTIONS
Called pt. Updated on results. Patient verbalizes understanding with no further questions or concerns at this time.    Don't forget to get your Shingles vaccine at the pharmacy.

## 2024-06-10 ENCOUNTER — LAB VISIT (OUTPATIENT)
Dept: LAB | Facility: HOSPITAL | Age: 64
End: 2024-06-10
Attending: INTERNAL MEDICINE
Payer: COMMERCIAL

## 2024-06-10 DIAGNOSIS — Z00.00 PREVENTATIVE HEALTH CARE: ICD-10-CM

## 2024-06-10 LAB
ALBUMIN SERPL BCP-MCNC: 3.9 G/DL (ref 3.5–5.2)
ALP SERPL-CCNC: 68 U/L (ref 55–135)
ALT SERPL W/O P-5'-P-CCNC: 28 U/L (ref 10–44)
ANION GAP SERPL CALC-SCNC: 11 MMOL/L (ref 8–16)
AST SERPL-CCNC: 20 U/L (ref 10–40)
BASOPHILS # BLD AUTO: 0.05 K/UL (ref 0–0.2)
BASOPHILS NFR BLD: 0.7 % (ref 0–1.9)
BILIRUB SERPL-MCNC: 0.5 MG/DL (ref 0.1–1)
BUN SERPL-MCNC: 18 MG/DL (ref 8–23)
CALCIUM SERPL-MCNC: 9.8 MG/DL (ref 8.7–10.5)
CHLORIDE SERPL-SCNC: 103 MMOL/L (ref 95–110)
CHOLEST SERPL-MCNC: 103 MG/DL (ref 120–199)
CHOLEST/HDLC SERPL: 2.3 {RATIO} (ref 2–5)
CO2 SERPL-SCNC: 27 MMOL/L (ref 23–29)
COMPLEXED PSA SERPL-MCNC: 3.5 NG/ML (ref 0–4)
CREAT SERPL-MCNC: 1.2 MG/DL (ref 0.5–1.4)
DIFFERENTIAL METHOD BLD: ABNORMAL
EOSINOPHIL # BLD AUTO: 0.2 K/UL (ref 0–0.5)
EOSINOPHIL NFR BLD: 2.6 % (ref 0–8)
ERYTHROCYTE [DISTWIDTH] IN BLOOD BY AUTOMATED COUNT: 13 % (ref 11.5–14.5)
EST. GFR  (NO RACE VARIABLE): >60 ML/MIN/1.73 M^2
GLUCOSE SERPL-MCNC: 126 MG/DL (ref 70–110)
HCT VFR BLD AUTO: 44.4 % (ref 40–54)
HDLC SERPL-MCNC: 44 MG/DL (ref 40–75)
HDLC SERPL: 42.7 % (ref 20–50)
HGB BLD-MCNC: 14.9 G/DL (ref 14–18)
IMM GRANULOCYTES # BLD AUTO: 0.06 K/UL (ref 0–0.04)
IMM GRANULOCYTES NFR BLD AUTO: 0.8 % (ref 0–0.5)
LDLC SERPL CALC-MCNC: 48 MG/DL (ref 63–159)
LYMPHOCYTES # BLD AUTO: 1.6 K/UL (ref 1–4.8)
LYMPHOCYTES NFR BLD: 20.7 % (ref 18–48)
MCH RBC QN AUTO: 29.1 PG (ref 27–31)
MCHC RBC AUTO-ENTMCNC: 33.6 G/DL (ref 32–36)
MCV RBC AUTO: 87 FL (ref 82–98)
MONOCYTES # BLD AUTO: 0.7 K/UL (ref 0.3–1)
MONOCYTES NFR BLD: 9.5 % (ref 4–15)
NEUTROPHILS # BLD AUTO: 5.1 K/UL (ref 1.8–7.7)
NEUTROPHILS NFR BLD: 65.7 % (ref 38–73)
NONHDLC SERPL-MCNC: 59 MG/DL
NRBC BLD-RTO: 0 /100 WBC
PLATELET # BLD AUTO: 182 K/UL (ref 150–450)
PMV BLD AUTO: 11.3 FL (ref 9.2–12.9)
POTASSIUM SERPL-SCNC: 4.7 MMOL/L (ref 3.5–5.1)
PROT SERPL-MCNC: 7 G/DL (ref 6–8.4)
RBC # BLD AUTO: 5.12 M/UL (ref 4.6–6.2)
SODIUM SERPL-SCNC: 141 MMOL/L (ref 136–145)
TRIGL SERPL-MCNC: 55 MG/DL (ref 30–150)
TSH SERPL DL<=0.005 MIU/L-ACNC: 2.59 UIU/ML (ref 0.4–4)
WBC # BLD AUTO: 7.69 K/UL (ref 3.9–12.7)

## 2024-06-10 PROCEDURE — 80053 COMPREHEN METABOLIC PANEL: CPT | Performed by: INTERNAL MEDICINE

## 2024-06-10 PROCEDURE — 84153 ASSAY OF PSA TOTAL: CPT | Performed by: INTERNAL MEDICINE

## 2024-06-10 PROCEDURE — 36415 COLL VENOUS BLD VENIPUNCTURE: CPT | Mod: PO | Performed by: INTERNAL MEDICINE

## 2024-06-10 PROCEDURE — 84443 ASSAY THYROID STIM HORMONE: CPT | Performed by: INTERNAL MEDICINE

## 2024-06-10 PROCEDURE — 85025 COMPLETE CBC W/AUTO DIFF WBC: CPT | Performed by: INTERNAL MEDICINE

## 2024-06-10 PROCEDURE — 80061 LIPID PANEL: CPT | Performed by: INTERNAL MEDICINE

## 2024-06-17 ENCOUNTER — OFFICE VISIT (OUTPATIENT)
Dept: INTERNAL MEDICINE | Facility: CLINIC | Age: 64
End: 2024-06-17
Payer: COMMERCIAL

## 2024-06-17 VITALS
HEART RATE: 71 BPM | OXYGEN SATURATION: 96 % | BODY MASS INDEX: 41.67 KG/M2 | HEIGHT: 71 IN | SYSTOLIC BLOOD PRESSURE: 136 MMHG | DIASTOLIC BLOOD PRESSURE: 86 MMHG | WEIGHT: 297.63 LBS

## 2024-06-17 DIAGNOSIS — I47.10 SVT (SUPRAVENTRICULAR TACHYCARDIA): ICD-10-CM

## 2024-06-17 DIAGNOSIS — Z12.11 SCREEN FOR COLON CANCER: ICD-10-CM

## 2024-06-17 DIAGNOSIS — F90.9 ATTENTION DEFICIT HYPERACTIVITY DISORDER (ADHD), UNSPECIFIED ADHD TYPE: ICD-10-CM

## 2024-06-17 DIAGNOSIS — Z00.00 PREVENTATIVE HEALTH CARE: Primary | ICD-10-CM

## 2024-06-17 DIAGNOSIS — R73.9 HYPERGLYCEMIA: ICD-10-CM

## 2024-06-17 DIAGNOSIS — I25.10 CORONARY ARTERY DISEASE INVOLVING NATIVE CORONARY ARTERY OF NATIVE HEART WITHOUT ANGINA PECTORIS: ICD-10-CM

## 2024-06-17 DIAGNOSIS — E78.5 DYSLIPIDEMIA: ICD-10-CM

## 2024-06-17 DIAGNOSIS — G47.33 OSA ON CPAP: ICD-10-CM

## 2024-06-17 PROCEDURE — 3075F SYST BP GE 130 - 139MM HG: CPT | Mod: CPTII,S$GLB,, | Performed by: INTERNAL MEDICINE

## 2024-06-17 PROCEDURE — 1160F RVW MEDS BY RX/DR IN RCRD: CPT | Mod: CPTII,S$GLB,, | Performed by: INTERNAL MEDICINE

## 2024-06-17 PROCEDURE — 99999 PR PBB SHADOW E&M-EST. PATIENT-LVL IV: CPT | Mod: PBBFAC,,, | Performed by: INTERNAL MEDICINE

## 2024-06-17 PROCEDURE — 99396 PREV VISIT EST AGE 40-64: CPT | Mod: S$GLB,,, | Performed by: INTERNAL MEDICINE

## 2024-06-17 PROCEDURE — 3079F DIAST BP 80-89 MM HG: CPT | Mod: CPTII,S$GLB,, | Performed by: INTERNAL MEDICINE

## 2024-06-17 PROCEDURE — 3008F BODY MASS INDEX DOCD: CPT | Mod: CPTII,S$GLB,, | Performed by: INTERNAL MEDICINE

## 2024-06-17 PROCEDURE — 1159F MED LIST DOCD IN RCRD: CPT | Mod: CPTII,S$GLB,, | Performed by: INTERNAL MEDICINE

## 2024-06-19 NOTE — PROGRESS NOTES
Patient ID: Aj Marte is a 63 y.o. male    Chief Complaint: Annual Exam, ADHD, and Follow-up    History of Present Illness  The patient is a 63-year-old male coming in today for annual physical.    The patient reports no adverse effects or complications with his ADHD medications. He monitors his blood pressure at home, which remains within the normal range. He underwent a stent placement in 2023, performed by Dr. Montejo, resulting in a 76 percent blockage in one of his arteries. His SVT has been well-managed, although he experienced intermittent episodes of SVT last week, lasting approximately an hour. These episodes, however, do not cause him significant discomfort. He manages these episodes with diltiazem, which he takes as needed. Dr. Montejo has suggested an ablation procedure if the SVT becomes problematic. The patient has been on Repatha, administered as three injections, for his CAD management. He has expressed concerns about his elevated glucose and white blood cell count, which he attributes to consumption of ice cream the previous day. He has been consuming [employer] Meals. His PSA levels have slightly increased, but he denies any urinary symptoms post-cardiac intervention. However, he experiences incomplete bladder emptying if he waits too long to urinate.    Supplemental Information  He had 2 skin cancers removed. He was sent to a [facility] doctor because she was upset that it came back on that same side. The pathology was basal cell.   His father and brother  early. His brother may have had a mild heart attack. He denies any family history of colon cancer.    ROS: Otherwise Negative     Physical Exam  Vital Signs  Patient's weight is 297.  General: Well-appearing, well-nourished.  No distress  HEENT: conjunctivae are normal.  Pupils are equal and reative to light.  TM's are clear and intact bilaterally.  Hearing is grossly normal.  Nasopharynx is clear.  Oropharynx is clear.  Neck: Supple.  No  thyroid megaly.  No bruits.  Lymph: No cervical or supraclavicular adenopathy.  Heart: Regular rate and rhythm, without murmur, rub or gallop.  Lungs: Clear to auscultation; respiratory effort normal.  Abdomen: Soft, nontender, nondistended.  Normoactive bowel sounds.  No hepatomegaly.  No masses.  Extremities: Good distal pulses.  No edema.  Psych: Oriented to time person place.  Judgment and insight seem unimpaired.  Mood and affect are appropriate.  Results  Laboratory Studies  White blood cell count is normal. Granulocytes are normal. LDL cholesterol is 48. PSA is 3.5.    Assessment & Plan  1. Annual physical.  The patient's overall white blood cell count is within the normal range. His granulocytes are generally satisfactory, with a slight elevation in his immature granulocytes. His LDL levels are within the 40s, and his cholesterol levels are within the normal range. A discussion was held regarding the importance of weight loss, with a focus on reducing sugars and starches, increasing water intake, incorporating lean proteins and grilled vegetables into his diet, and increasing physical activity. A hemoglobin A1c level recheck will be conducted in 4 weeks. A FIT kit will be provided. His PSA levels will be reassessed in 4 months.    Follow-up  A follow-up appointment is scheduled for 4 months from now.      Follow up in about 4 months (around 10/17/2024) for HTN cmp lipidsShonda Rocha was seen today for annual exam, adhd and follow-up.    Diagnoses and all orders for this visit:    Preventative health care  Healthcare maintenance performed, reviewed, updated and counseled today.  Dyslipidemia  -     Comprehensive Metabolic Panel; Standing  -     Lipid Panel; Standing  -     Hemoglobin A1C; Standing    Coronary artery disease involving native coronary artery of native heart without angina pectoris  Continue risk factor and statin management  Attention deficit hyperactivity disorder (ADHD), unspecified ADHD  type  Controlled.  Continue current medical regimen.  Prescription refills addressed.  Followup advised. See after visit summary.  SVT (supraventricular tachycardia)  Followed closely by Cardiology  ALEENA on CPAP    Hyperglycemia  -     BASIC METABOLIC PANEL; Future  -     Hemoglobin A1C; Standing  -     Hemoglobin A1C; Standing    Screen for colon cancer  -     Fecal Immunochemical Test (iFOBT); Future         This note was generated with the assistance of ambient listening technology. Verbal consent was obtained by the patient and accompanying visitor(s) for the recording of patient appointment to facilitate this note. I attest to having reviewed and edited the generated note for accuracy, though some syntax or spelling errors may persist. Please contact the author of this note for any clarification.

## 2024-06-24 ENCOUNTER — PATIENT MESSAGE (OUTPATIENT)
Dept: INTERNAL MEDICINE | Facility: CLINIC | Age: 64
End: 2024-06-24
Payer: COMMERCIAL

## 2024-07-15 ENCOUNTER — LAB VISIT (OUTPATIENT)
Dept: LAB | Facility: HOSPITAL | Age: 64
End: 2024-07-15
Attending: INTERNAL MEDICINE
Payer: COMMERCIAL

## 2024-07-15 DIAGNOSIS — R73.9 HYPERGLYCEMIA: ICD-10-CM

## 2024-07-15 LAB
ANION GAP SERPL CALC-SCNC: 8 MMOL/L (ref 8–16)
BUN SERPL-MCNC: 22 MG/DL (ref 8–23)
CALCIUM SERPL-MCNC: 9.4 MG/DL (ref 8.7–10.5)
CHLORIDE SERPL-SCNC: 104 MMOL/L (ref 95–110)
CO2 SERPL-SCNC: 28 MMOL/L (ref 23–29)
CREAT SERPL-MCNC: 1 MG/DL (ref 0.5–1.4)
EST. GFR  (NO RACE VARIABLE): >60 ML/MIN/1.73 M^2
ESTIMATED AVG GLUCOSE: 114 MG/DL (ref 68–131)
GLUCOSE SERPL-MCNC: 115 MG/DL (ref 70–110)
HBA1C MFR BLD: 5.6 % (ref 4–5.6)
POTASSIUM SERPL-SCNC: 4.1 MMOL/L (ref 3.5–5.1)
SODIUM SERPL-SCNC: 140 MMOL/L (ref 136–145)

## 2024-07-15 PROCEDURE — 83036 HEMOGLOBIN GLYCOSYLATED A1C: CPT | Performed by: INTERNAL MEDICINE

## 2024-07-15 PROCEDURE — 36415 COLL VENOUS BLD VENIPUNCTURE: CPT | Mod: PO | Performed by: INTERNAL MEDICINE

## 2024-07-15 PROCEDURE — 80048 BASIC METABOLIC PNL TOTAL CA: CPT | Performed by: INTERNAL MEDICINE

## 2024-07-20 ENCOUNTER — LAB VISIT (OUTPATIENT)
Dept: LAB | Facility: HOSPITAL | Age: 64
End: 2024-07-20
Attending: INTERNAL MEDICINE
Payer: COMMERCIAL

## 2024-07-20 DIAGNOSIS — Z12.11 SCREEN FOR COLON CANCER: ICD-10-CM

## 2024-07-20 PROCEDURE — 82274 ASSAY TEST FOR BLOOD FECAL: CPT | Performed by: INTERNAL MEDICINE

## 2024-07-23 LAB — HEMOCCULT STL QL IA: NEGATIVE

## 2024-08-14 ENCOUNTER — TELEPHONE (OUTPATIENT)
Dept: ELECTROPHYSIOLOGY | Facility: CLINIC | Age: 64
End: 2024-08-14
Payer: COMMERCIAL

## 2024-08-20 ENCOUNTER — HOSPITAL ENCOUNTER (OUTPATIENT)
Dept: RADIOLOGY | Facility: HOSPITAL | Age: 64
Discharge: HOME | End: 2024-08-20
Attending: FAMILY MEDICINE

## 2024-08-20 DIAGNOSIS — Z02.1 PRE-EMPLOYMENT EXAMINATION: ICD-10-CM

## 2024-08-20 DIAGNOSIS — Z02.1 PRE-EMPLOYMENT EXAMINATION: Primary | ICD-10-CM

## 2024-08-20 PROCEDURE — 71046 X-RAY EXAM CHEST 2 VIEWS: CPT | Mod: TC,FY,PN

## 2024-08-20 PROCEDURE — 72100 X-RAY EXAM L-S SPINE 2/3 VWS: CPT | Mod: TC,FY,PN

## 2024-09-04 RX ORDER — DILTIAZEM HYDROCHLORIDE 120 MG/1
120 CAPSULE, EXTENDED RELEASE ORAL
Qty: 90 CAPSULE | Refills: 3 | Status: SHIPPED | OUTPATIENT
Start: 2024-09-04

## 2024-10-18 ENCOUNTER — LAB VISIT (OUTPATIENT)
Dept: LAB | Facility: HOSPITAL | Age: 64
End: 2024-10-18
Attending: INTERNAL MEDICINE
Payer: COMMERCIAL

## 2024-10-18 DIAGNOSIS — E78.5 DYSLIPIDEMIA: ICD-10-CM

## 2024-10-18 DIAGNOSIS — R73.9 HYPERGLYCEMIA: ICD-10-CM

## 2024-10-18 LAB
ALBUMIN SERPL BCP-MCNC: 4 G/DL (ref 3.5–5.2)
ALP SERPL-CCNC: 64 U/L (ref 40–150)
ALT SERPL W/O P-5'-P-CCNC: 25 U/L (ref 10–44)
ANION GAP SERPL CALC-SCNC: 7 MMOL/L (ref 8–16)
AST SERPL-CCNC: 23 U/L (ref 10–40)
BILIRUB SERPL-MCNC: 0.6 MG/DL (ref 0.1–1)
BUN SERPL-MCNC: 18 MG/DL (ref 8–23)
CALCIUM SERPL-MCNC: 10.1 MG/DL (ref 8.7–10.5)
CHLORIDE SERPL-SCNC: 104 MMOL/L (ref 95–110)
CHOLEST SERPL-MCNC: 88 MG/DL (ref 120–199)
CHOLEST/HDLC SERPL: 2.3 {RATIO} (ref 2–5)
CO2 SERPL-SCNC: 27 MMOL/L (ref 23–29)
CREAT SERPL-MCNC: 1 MG/DL (ref 0.5–1.4)
EST. GFR  (NO RACE VARIABLE): >60 ML/MIN/1.73 M^2
ESTIMATED AVG GLUCOSE: 111 MG/DL (ref 68–131)
GLUCOSE SERPL-MCNC: 104 MG/DL (ref 70–110)
HBA1C MFR BLD: 5.5 % (ref 4–5.6)
HDLC SERPL-MCNC: 39 MG/DL (ref 40–75)
HDLC SERPL: 44.3 % (ref 20–50)
LDLC SERPL CALC-MCNC: 33.2 MG/DL (ref 63–159)
NONHDLC SERPL-MCNC: 49 MG/DL
POTASSIUM SERPL-SCNC: 4.2 MMOL/L (ref 3.5–5.1)
PROT SERPL-MCNC: 7.1 G/DL (ref 6–8.4)
SODIUM SERPL-SCNC: 138 MMOL/L (ref 136–145)
TRIGL SERPL-MCNC: 79 MG/DL (ref 30–150)

## 2024-10-18 PROCEDURE — 83036 HEMOGLOBIN GLYCOSYLATED A1C: CPT | Performed by: INTERNAL MEDICINE

## 2024-10-18 PROCEDURE — 80061 LIPID PANEL: CPT | Performed by: INTERNAL MEDICINE

## 2024-10-18 PROCEDURE — 80053 COMPREHEN METABOLIC PANEL: CPT | Performed by: INTERNAL MEDICINE

## 2024-10-22 ENCOUNTER — PATIENT MESSAGE (OUTPATIENT)
Dept: INTERNAL MEDICINE | Facility: CLINIC | Age: 64
End: 2024-10-22
Payer: COMMERCIAL

## 2024-10-23 ENCOUNTER — OFFICE VISIT (OUTPATIENT)
Dept: INTERNAL MEDICINE | Facility: CLINIC | Age: 64
End: 2024-10-23
Payer: COMMERCIAL

## 2024-10-23 DIAGNOSIS — N40.1 BPH WITH OBSTRUCTION/LOWER URINARY TRACT SYMPTOMS: ICD-10-CM

## 2024-10-23 DIAGNOSIS — Z00.00 PREVENTATIVE HEALTH CARE: ICD-10-CM

## 2024-10-23 DIAGNOSIS — F90.9 ATTENTION DEFICIT HYPERACTIVITY DISORDER (ADHD), UNSPECIFIED ADHD TYPE: ICD-10-CM

## 2024-10-23 DIAGNOSIS — I10 PRIMARY HYPERTENSION: Primary | ICD-10-CM

## 2024-10-23 DIAGNOSIS — N13.8 BPH WITH OBSTRUCTION/LOWER URINARY TRACT SYMPTOMS: ICD-10-CM

## 2024-10-23 DIAGNOSIS — E78.2 MIXED HYPERLIPIDEMIA: ICD-10-CM

## 2024-10-23 DIAGNOSIS — G47.33 OSA ON CPAP: ICD-10-CM

## 2024-10-23 PROCEDURE — 1160F RVW MEDS BY RX/DR IN RCRD: CPT | Mod: CPTII,95,, | Performed by: INTERNAL MEDICINE

## 2024-10-23 PROCEDURE — 1159F MED LIST DOCD IN RCRD: CPT | Mod: CPTII,95,, | Performed by: INTERNAL MEDICINE

## 2024-10-23 PROCEDURE — 3044F HG A1C LEVEL LT 7.0%: CPT | Mod: CPTII,95,, | Performed by: INTERNAL MEDICINE

## 2024-10-23 PROCEDURE — 99214 OFFICE O/P EST MOD 30 MIN: CPT | Mod: 95,,, | Performed by: INTERNAL MEDICINE

## 2024-10-23 RX ORDER — LISDEXAMFETAMINE DIMESYLATE 30 MG/1
30 CAPSULE ORAL EVERY MORNING
Qty: 90 CAPSULE | Refills: 0 | Status: SHIPPED | OUTPATIENT
Start: 2024-10-23

## 2024-10-23 RX ORDER — TAMSULOSIN HYDROCHLORIDE 0.4 MG/1
0.4 CAPSULE ORAL DAILY
Qty: 90 CAPSULE | Refills: 1 | Status: SHIPPED | OUTPATIENT
Start: 2024-10-23 | End: 2025-10-23

## 2024-10-23 NOTE — PROGRESS NOTES
Patient ID: Aj Marte is a 64 y.o. male    Chief Complaint: Hypertension    History of Present Illness  The patient is a 64-year-old male who presents today for ADD follow-up.    He is currently on Repatha and Zetia, as prescribed by his cardiologist, Dr. Quiñones. He has been using a CPAP machine for the past 5 years, which he reports is functioning well and preventing him from snoring. He wakes up feeling refreshed in the morning. However, he has noticed an increase in his frequency of urination.    FAMILY HISTORY  He denies any family history of prostate cancer.    ROS: Otherwise Negative     Physical Exam  General: Well-appearing, well-nourished.  No distress  HEENT: conjunctivae are normal.   Hearing is grossly normal.  Nasopharynx robbin congestion  Oropharynx is clear.  Neck: Supple.  Visually No thyroid megaly.   Heart: Regular rate   Lungs:  respiratory effort normal.  Abdomen:  nontender  Extremities:   No edema.  Psych: Oriented to time person place.  Judgment and insight seem unimpaired.  Mood and affect are appropriate..    Results  Laboratory Studies  Blood sugar is normal. Kidney function is normal. Cholesterol is low.    Assessment & Plan  1. Attention Deficit Disorder (ADD).  His Vyvanse prescription was refilled and sent to [facility]. He is advised to continue taking Vyvanse daily.    2. Hyperlipidemia.  He is currently taking Repatha, Zetia, aspirin, and Plavix. His blood work from 10/18/2024 shows normal sugar levels, good kidney function, and low cholesterol. The cardiologist may discontinue Zetia if his cholesterol remains under 100.    3. Benign Prostatic Hyperplasia (BPH).  He reports increased urinary frequency. Flomax was prescribed to address urinary hesitancy. He can take it at any time of the day.    4. Obstructive Sleep Apnea.  He has been using a CPAP machine for 5 years, which is now leaking and may need replacement. A referral to a sleep clinic was made to evaluate the machine and  maintain supplies.    5. Health Maintenance.  His PSA was last checked in June 2024. A PSA test will be included in his next blood work.    Follow-up  Return in 6 months for an annual checkup.      No follow-ups on file.    Aj was seen today for hypertension.    Diagnoses and all orders for this visit:    Primary hypertension  Controlled.  Continue current medical regimen.  Prescription refills addressed.  Followup advised. See after visit summary.  Mixed hyperlipidemia  Controlled.  Continue current medical regimen.  Prescription refills addressed.  Followup advised. See after visit summary.  Attention deficit hyperactivity disorder (ADHD), unspecified ADHD type  -     lisdexamfetamine (VYVANSE) 30 MG capsule; Take 1 capsule (30 mg total) by mouth every morning.  Controlled.  Continue current medical regimen.  Prescription refills addressed.  Followup advised. See after visit summary.  BPH with obstruction/lower urinary tract symptoms  -     tamsulosin (FLOMAX) 0.4 mg Cap; Take 1 capsule (0.4 mg total) by mouth once daily.  Discuss use benefit as well as potential adverse side effects  ALEENA on CPAP  -     Ambulatory referral/consult to Sleep Disorders; Future    Preventative health care  -     CBC Auto Differential; Future  -     Comprehensive Metabolic Panel; Future  -     TSH; Future  -     Lipid Panel; Future  -     PSA, Screening; Future  -     Hemoglobin A1C; Standing  The patient location is: [facility]  The chief complaint leading to consultation is:  Hypertension dyslipidemia ADD BPH    Visit type: Audio Visual    Face to Face time with patient: 20 Minutes  20 minutes of total time spent on the encounter, which includes face to face time and non-face to face time preparing to see the patient (eg, review of tests), Obtaining and/or reviewing separately obtained history, Documenting clinical information in the electronic or other health record, Independently interpreting results (not separately reported) and  communicating results to the patient/family/caregiver, or Care coordination (not separately reported).         Each patient to whom he or she provides medical services by telemedicine is:  (1) informed of the relationship between the physician and patient and the respective role of any other health care provider with respect to management of the patient; and (2) notified that he or she may decline to receive medical services by telemedicine and may withdraw from such care at any time.    Notes:          This note was generated with the assistance of ambient listening technology. Verbal consent was obtained by the patient and accompanying visitor(s) for the recording of patient appointment to facilitate this note. I attest to having reviewed and edited the generated note for accuracy, though some syntax or spelling errors may persist. Please contact the author of this note for any clarification.

## 2024-11-13 NOTE — PROGRESS NOTES
"Mr. Marte is a patient of Dr. Schaefer and was last seen in clinic 5/13/2024.      Subjective:   Patient ID:  Aj Marte is a 64 y.o. male who presents for follow up of SVT  .     HPI:    Mr. Marte is a 64 y.o. male with SVT, RBBB, CAD (PCI of LAD 3/2024), ADHD, HTN, HLD, ALEENA (on CPAP), obesity here for follow up.     Background:    Mr. Aj Marte has a hx of SVT, baseline RBBB, coronary artery disease with a recent angiogram and PCI of the mid LAD on 3/15/2024, ADHD, hyperlipidemia, hypertension, ALEENA maintained on CPAP therapy, and obesity. He recently had a pharmacologic nuclear cardiac stress test that was concerning for possible ischemia, and underwent a successful angiogram revealing a 70% stenosis of the mid LAD, s/p placement of a JAMEY x1 with Dr. Rom Quiñones at [facility] on 3/15/2024.     He was previously followed in general cardiology with Dr. Higginbotham, and has since transitioned his general cardiology care to Dr. Rom Quiñones after Dr. Higginbotham relocated his practice. He was previously seen in clinic on 11/3/2022. He presented to the ED on 10/22/2022 with complaints of palpitations, noted to have racing heart rates, with HR >200 bpm. He tells me that at the time of this event, he was seated in his office, when he spontaneously started "feeling jittery" for about two hours. He reports feeling like his heart was racing, but denies any symptoms of wayne palpitations or irregular heart beats. When his symptoms did not remit, he presented to the ED for further evaluation. On ECG review from this encounter, he had an episode of SVT with RBBB at a rate of 209 bpm with inability to detect P waves. He was instructed to breathe forcefully into a syringe with spontaneous conversion to sinus tachycardia with RBBB. He was started on metoprolol succinate 25mg po daily, although reported that this medication was not effective at adequately controlling his palpitations. He has been monitoring his heart " rates with a watch, and notes that he had one issue with increased heart rates. At our prior clinic visit, we discontinued his metoprolol succinate and initiated diltiazem 120mg po daily, with diltiazem 30mg po PRN palpitations. He feels that this regimen has resolved his palpitation events, and he has only had to take his PRN diltiazem twice since this event. He subsequently underwent a right knee surgery and denies any complications with this surgery. He has completed physical therapy for his knee, and is able to walk with no pain at present. He has resumed his ADHD medications with no changes in his symptoms. Unfortunately, Dr. Higginbotham relocated his practice, and he has since transitioned his cardiology care to Dr. Quiñones at [facility] in [place]. He underwent a pharmacologic nuclear cardiac stress test that was concerning for possible ischemia. Given his very strong family history of early coronary artery disease and his positive stress test, he underwent a successful angiogram revealing a 70% stenosis of the mid LAD, s/p placement of a JAMEY x1 with Dr. Rom Quiñones at [facility] on 3/15/2024. He has since been initiated on DAPT with aspirin and Plavix and denies any chest pain, changes to his baseline mild shortness of breath, or subsequent palpitations. He has a [employer] wearable device that has captured periods of sinus tachycardia and sinus bradycardia, with no arrhythmia recorded.      In discussion with Mr. Marte today, he tells me that he is feeling overall quite well. He denies any episodes of dizziness, lightheadedness, syncope/presyncope, chest pain or chest discomfort, nausea or vomiting, orthopnea, lower extremity edema, or PND. He denies any recurrent sustained palpitations on his diltiazem regimen, although he occasionally reports brief palpitations lasting a matter of seconds to a minute. He reports baseline mild shortness of breath and dyspnea with exertion that has remained stable for him. He could previously  walk for about a mile without limitation, but following his right knee surgery he has not resumed walking farther distances yet. 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 He has cut back on his caffeine consumption to 2 cups of coffee per day. He denies any recreational drug use, and has resumed taking his ADHD medications with no changes in his palpitation symptoms.      - We discussed the pathophysiology of supraventricular tachycardia, and the fact that SVT is an umbrella term that encompasses several different types of atrial arrhythmias. We reviewed the results of his prior presentation to the ED with an episode of SVT with a rate of approximately 210 bpm. This SVT may represent AVNRT, AVRT, or less likely, a JT. 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 A rudimentary diagram was drawn for the patient to assist in education.   - We discussed the concept of undergoing an EP study in order to better characterize his SVT and for possible definitive ablation. We discussed undergoing radiofrequency ablation with slow pathway modification. The procedure itself, risks, benefits, and alternative options were discussed. As Mr. Marte reports that he has had one sustained episode, he would prefer to continue pharmacologic suppression prior to consideration of EPS and ablation.   - We reviewed the results of his recent echocardiogram revealing preserved systolic function with LVEF 55%.  - He will continue diltiazem 120mg po daily, with a short-acting diltiazem 30mg po PRN palpitations. He has not required frequent use of his PRN diltiazem, and reports that he has not had any subsequent events of sustained palpitations while on this regimen. He is monitoring his rhythm at home with a [employer] wearable monitor and has  not recorded any arrhythmia. He recently completed an unremarkable ambulatory event monitor with Dr. Quiñones, in addition to a repeat echocardiogram. We will obtain these outside records. His angiogram report was scanned into his medical record for review.   - We discussed abortive measures when he has an episode of palpitations, such as forced coughing, Valsalva maneuver, modified Valsalva maneuver with forced exhalation through a high resistance structure (such a drinking straw), unilateral carotid sinus massage, and ice/cold water application to the face and eyes. When he has an episode of recurrent palpitations, he was instructed to attempt physical abortive measures first. In the event this is ineffective, he was instructed to take a short-acting diltiazem and continue abortive maneuvers. Should his palpitations persist despite abortive maneuvers and short-acting diltiazem, he was instructed to present to the ED for possible adenosine administration or cardioversion.   - He will continue to closely follow with Dr. Quiñones for ongoing management of his coronary artery disease with continued DAPT for at least one year. He was discontinued on statin therapy secondary to myalgia and is maintained on Zetia 10mg po daily and Repatha with a repeat lipid profile to be obtained with Dr. Quiñones next month. His ambulatory blood pressure measurements remain at goal per his report on continued diltiazem 120mg po daily and HCTZ 25mg po daily.   This patient will return to clinic with me in six months for ongoing surveillance. In the event he has a subsequent event of sustained palpitations, he was instructed to contact my office for consideration for EPS and ablation. All questions and concerns were addressed at this encounter.     Update (11/15/2024):    Today he says he is feeling well overall. No sustained palpitations like his prior SVT episode but does show me some infrequent episodes of fast HRs logged by his watch -  episodes have been during sleep other than one episode in June where he was going to bed and took a PRN dose of dilt 30mg with resolution. No other cardiac symptoms: no CP, VARGAS, LH, syncope reported.    On diltiazem 120mg daily.    I have personally reviewed the patient's EKG today, which shows sinus rhythm with RBBB at 88bpm. FL interval is 146. QRS is 160. QTc is 503.    Relevant Cardiac Test Results:    2D Echo (10/22/2022):  The left ventricle is normal in size with concentric remodeling and normal systolic function.  The estimated ejection fraction is 55%.  Normal left ventricular diastolic function.  Mild right ventricular enlargement with normal right ventricular systolic function.  Mild to moderate left atrial enlargement.  Mild right atrial enlargement.  Intermediate central venous pressure (8 mmHg).  The estimated PA systolic pressure is 22 mmHg.    Current Outpatient Medications   Medication Sig    aspirin (ECOTRIN) 81 MG EC tablet Take 81 mg by mouth.    clopidogreL (PLAVIX) 75 mg tablet Take 75 mg by mouth once daily.    diltiaZEM (DILACOR XR) 120 MG CDCR TAKE 1 CAPSULE BY MOUTH EVERY DAY    ezetimibe (ZETIA) 10 mg tablet Take 10 mg by mouth once daily.    hydroCHLOROthiazide (HYDRODIURIL) 25 MG tablet Take 25 mg by mouth once daily.    ketoconazole (NIZORAL) 2 % shampoo Apply topically. Apply to scalp 2-3 times a week    lisdexamfetamine (VYVANSE) 30 MG capsule Take 1 capsule (30 mg total) by mouth every morning.    REPATHA SURECLICK 140 mg/mL PnIj 140 mg every 14 (fourteen) days.    tamsulosin (FLOMAX) 0.4 mg Cap Take 1 capsule (0.4 mg total) by mouth once daily.     No current facility-administered medications for this visit.       Review of Systems   Constitutional: Negative for malaise/fatigue.   Cardiovascular:  Positive for palpitations. Negative for chest pain, dyspnea on exertion, irregular heartbeat and leg swelling.   Respiratory:  Negative for shortness of breath.    Hematologic/Lymphatic:  "Negative for bleeding problem.   Skin:  Negative for rash.   Musculoskeletal:  Negative for myalgias.   Gastrointestinal:  Negative for hematemesis, hematochezia and nausea.   Genitourinary:  Negative for hematuria.   Neurological:  Negative for light-headedness.   Psychiatric/Behavioral:  Negative for altered mental status.    Allergic/Immunologic: Negative for persistent infections.       Objective:          BP (!) 140/80   Pulse 88   Ht 5' 11" (1.803 m)   Wt 132.5 kg (292 lb 1.8 oz)   BMI 40.74 kg/m²     Physical Exam  Vitals and nursing note reviewed.   Constitutional:       Appearance: Normal appearance. He is well-developed.   HENT:      Head: Normocephalic.      Nose: Nose normal.   Eyes:      Pupils: Pupils are equal, round, and reactive to light.   Cardiovascular:      Rate and Rhythm: Normal rate and regular rhythm.   Pulmonary:      Effort: No respiratory distress.   Musculoskeletal:         General: Normal range of motion.   Skin:     General: Skin is warm and dry.      Findings: No erythema.   Neurological:      Mental Status: He is alert and oriented to person, place, and time.   Psychiatric:         Speech: Speech normal.         Behavior: Behavior normal.           Lab Results   Component Value Date     10/18/2024    K 4.2 10/18/2024    MG 1.7 10/21/2022    BUN 18 10/18/2024    CREATININE 1.0 10/18/2024    ALT 25 10/18/2024    AST 23 10/18/2024    HGB 14.9 06/10/2024    HCT 44.4 06/10/2024    TSH 2.586 06/10/2024    LDLCALC 33.2 (L) 10/18/2024             Assessment:     1. SVT (supraventricular tachycardia)    2. Primary hypertension    3. RBBB    4. ALEENA on CPAP        Plan:     In summary, Mr. Marte is a 64 y.o. male with SVT, RBBB, CAD (PCI of LAD 3/2024), ADHD, HTN, HLD, ALEENA (on CPAP), obesity here for follow up.   Overall continues to do well from a rhythm standpoint. Very infrequent episodes of fast HR during sleep with only one episode in June (HR 130s) while awake for which he took " a PRN diltiazem with resolution. Remains on daily diltiazem as well.   Recommend he use his Kardia during any future episodes that occur while awake and send strips to EP clinic to evaluate. Otherwise will continue with medical management and consider ablation if episodes become more frequent or sustained. ECG showing stable RBBB.    Continue daily diltiazem 120mg and can use PRN dilt 30mg for palps  Send any Kardia strips to EP clinic for review via portal  RTC 1 yr if symptoms are stable, sooner if needed    *A copy of this note has been sent to Dr. Schaefer*    Follow up in about 1 year (around 11/15/2025).    ------------------------------------------------------------------    ROSA Perdue, NP-C  Cardiac Electrophysiology

## 2024-11-15 ENCOUNTER — HOSPITAL ENCOUNTER (OUTPATIENT)
Dept: CARDIOLOGY | Facility: CLINIC | Age: 64
Discharge: HOME | End: 2024-11-15
Payer: COMMERCIAL

## 2024-11-15 ENCOUNTER — OFFICE VISIT (OUTPATIENT)
Dept: ELECTROPHYSIOLOGY | Facility: CLINIC | Age: 64
End: 2024-11-15
Payer: COMMERCIAL

## 2024-11-15 VITALS
BODY MASS INDEX: 40.9 KG/M2 | HEART RATE: 88 BPM | SYSTOLIC BLOOD PRESSURE: 140 MMHG | WEIGHT: 292.13 LBS | HEIGHT: 71 IN | DIASTOLIC BLOOD PRESSURE: 80 MMHG

## 2024-11-15 DIAGNOSIS — I10 PRIMARY HYPERTENSION: ICD-10-CM

## 2024-11-15 DIAGNOSIS — I49.8 OTHER CARDIAC ARRHYTHMIA: ICD-10-CM

## 2024-11-15 DIAGNOSIS — I45.10 RBBB: ICD-10-CM

## 2024-11-15 DIAGNOSIS — I47.10 SVT (SUPRAVENTRICULAR TACHYCARDIA): Primary | ICD-10-CM

## 2024-11-15 DIAGNOSIS — G47.33 OSA ON CPAP: ICD-10-CM

## 2024-11-15 LAB
OHS QRS DURATION: 160 MS
OHS QTC CALCULATION: 503 MS

## 2024-11-15 PROCEDURE — 93005 ELECTROCARDIOGRAM TRACING: CPT | Mod: S$GLB,,, | Performed by: STUDENT IN AN ORGANIZED HEALTH CARE EDUCATION/TRAINING PROGRAM

## 2024-11-15 PROCEDURE — 93010 ELECTROCARDIOGRAM REPORT: CPT | Mod: S$GLB,,, | Performed by: INTERNAL MEDICINE

## 2024-11-15 PROCEDURE — 99999 PR PBB SHADOW E&M-EST. PATIENT-LVL III: CPT | Mod: PBBFAC,,, | Performed by: NURSE PRACTITIONER

## 2024-11-15 RX ORDER — DILTIAZEM HYDROCHLORIDE 30 MG/1
30 TABLET, FILM COATED ORAL 4 TIMES DAILY PRN
Qty: 120 TABLET | Refills: 1 | Status: SHIPPED | OUTPATIENT
Start: 2024-11-15 | End: 2025-11-15

## 2024-12-11 ENCOUNTER — PATIENT MESSAGE (OUTPATIENT)
Dept: ADMINISTRATIVE | Facility: HOSPITAL | Age: 64
End: 2024-12-11
Payer: COMMERCIAL

## 2024-12-20 RX ORDER — DILTIAZEM HYDROCHLORIDE 30 MG/1
30 TABLET, FILM COATED ORAL 4 TIMES DAILY PRN
Qty: 360 TABLET | Refills: 3 | Status: SHIPPED | OUTPATIENT
Start: 2024-12-20 | End: 2025-12-20

## 2025-01-22 DIAGNOSIS — N13.8 BPH WITH OBSTRUCTION/LOWER URINARY TRACT SYMPTOMS: ICD-10-CM

## 2025-01-22 DIAGNOSIS — N40.1 BPH WITH OBSTRUCTION/LOWER URINARY TRACT SYMPTOMS: ICD-10-CM

## 2025-01-22 RX ORDER — TAMSULOSIN HYDROCHLORIDE 0.4 MG/1
1 CAPSULE ORAL
Qty: 90 CAPSULE | Refills: 2 | Status: SHIPPED | OUTPATIENT
Start: 2025-01-22

## 2025-01-22 NOTE — TELEPHONE ENCOUNTER
No care due was identified.  Health [facility] Embedded Care Due Messages. Reference number: [id number].   1/22/2025 2:35:20 PM CST

## 2025-01-22 NOTE — TELEPHONE ENCOUNTER
Refill Routing Note   Medication(s) are not appropriate for processing by [facility] for the following reason(s):        New or recently adjusted medication    [facility] action(s):  Defer               Appointments  past 12m or future 3m with PCP    Date Provider   Last Visit   10/23/2024 Alecia Mcghee MD   Next Visit   Visit date not found Alecia Mcghee MD   ED visits in past 90 days: 0        Note composed:2:42 PM 01/22/2025

## 2025-01-30 DIAGNOSIS — F90.9 ATTENTION DEFICIT HYPERACTIVITY DISORDER (ADHD), UNSPECIFIED ADHD TYPE: ICD-10-CM

## 2025-01-30 NOTE — TELEPHONE ENCOUNTER
No care due was identified.  Health [facility] Embedded Care Due Messages. Reference number: [id number].   1/30/2025 6:09:51 AM CST

## 2025-01-31 ENCOUNTER — PATIENT MESSAGE (OUTPATIENT)
Dept: INTERNAL MEDICINE | Facility: CLINIC | Age: 65
End: 2025-01-31
Payer: COMMERCIAL

## 2025-01-31 RX ORDER — LISDEXAMFETAMINE DIMESYLATE 30 MG/1
30 CAPSULE ORAL EVERY MORNING
Qty: 90 CAPSULE | Refills: 0 | Status: SHIPPED | OUTPATIENT
Start: 2025-01-31

## 2025-02-28 ENCOUNTER — OFFICE VISIT (OUTPATIENT)
Dept: SLEEP MEDICINE | Facility: CLINIC | Age: 65
End: 2025-02-28
Attending: PSYCHIATRY & NEUROLOGY
Payer: COMMERCIAL

## 2025-02-28 VITALS
WEIGHT: 296.13 LBS | HEART RATE: 77 BPM | BODY MASS INDEX: 41.3 KG/M2 | SYSTOLIC BLOOD PRESSURE: 144 MMHG | DIASTOLIC BLOOD PRESSURE: 66 MMHG

## 2025-02-28 DIAGNOSIS — I25.10 CORONARY ARTERY DISEASE INVOLVING NATIVE CORONARY ARTERY OF NATIVE HEART WITHOUT ANGINA PECTORIS: Primary | ICD-10-CM

## 2025-02-28 DIAGNOSIS — G47.33 OSA ON CPAP: ICD-10-CM

## 2025-02-28 DIAGNOSIS — I10 PRIMARY HYPERTENSION: ICD-10-CM

## 2025-02-28 PROCEDURE — 99999 PR PBB SHADOW E&M-EST. PATIENT-LVL III: CPT | Mod: PBBFAC,,, | Performed by: NURSE PRACTITIONER

## 2025-03-26 ENCOUNTER — PATIENT OUTREACH (OUTPATIENT)
Dept: ADMINISTRATIVE | Facility: HOSPITAL | Age: 65
End: 2025-03-26
Payer: COMMERCIAL

## 2025-03-26 VITALS — DIASTOLIC BLOOD PRESSURE: 72 MMHG | SYSTOLIC BLOOD PRESSURE: 109 MMHG

## 2025-03-26 NOTE — PROGRESS NOTES
03/26/2025  VB chart audit performed. Care Everywhere updates requested and reviewed  Overdue HM topic chart audit and/or requested. LINKS triggered and reconciled. Media reviewed.  Health Maintenance Topic(s) Outreach Outcomes & Actions Taken:    Blood Pressure - Outreach Outcomes & Actions Taken  : Remote Blood Pressure Reading Captured     Additional Notes:       Care Management, Digital Medicine, and/or Education Referrals  Next Steps - Referral Actions: Digital Medicine Outcomes and Actions Taken: Pt Declined or Not Eligible

## 2025-04-24 DIAGNOSIS — N40.1 BPH WITH OBSTRUCTION/LOWER URINARY TRACT SYMPTOMS: ICD-10-CM

## 2025-04-24 DIAGNOSIS — N13.8 BPH WITH OBSTRUCTION/LOWER URINARY TRACT SYMPTOMS: ICD-10-CM

## 2025-04-24 RX ORDER — TAMSULOSIN HYDROCHLORIDE 0.4 MG/1
CAPSULE ORAL
Qty: 90 CAPSULE | Refills: 1 | Status: SHIPPED | OUTPATIENT
Start: 2025-04-24

## 2025-04-24 NOTE — TELEPHONE ENCOUNTER
No care due was identified.  [facility] Embedded Care Due Messages. Reference number: [id number].   4/24/2025 2:10:02 PM CDT

## 2025-04-24 NOTE — TELEPHONE ENCOUNTER
Refill Decision Note   Aj Estuardo  is requesting a refill authorization.  Brief Assessment and Rationale for Refill:  Approve     Medication Therapy Plan:         Comments:     Note composed:2:24 PM 04/24/2025

## 2025-05-14 DIAGNOSIS — F90.9 ATTENTION DEFICIT HYPERACTIVITY DISORDER (ADHD), UNSPECIFIED ADHD TYPE: ICD-10-CM

## 2025-05-14 NOTE — TELEPHONE ENCOUNTER
No care due was identified.  [facility] Embedded Care Due Messages. Reference number: [id number].   5/14/2025 7:30:29 AM CDT

## 2025-05-16 RX ORDER — LISDEXAMFETAMINE DIMESYLATE 30 MG/1
30 CAPSULE ORAL EVERY MORNING
Qty: 90 CAPSULE | Refills: 0 | Status: SHIPPED | OUTPATIENT
Start: 2025-05-16

## 2025-05-20 ENCOUNTER — OFFICE VISIT (OUTPATIENT)
Dept: SLEEP MEDICINE | Facility: CLINIC | Age: 65
End: 2025-05-20
Payer: COMMERCIAL

## 2025-05-20 VITALS
HEART RATE: 70 BPM | HEIGHT: 71 IN | SYSTOLIC BLOOD PRESSURE: 120 MMHG | WEIGHT: 295.44 LBS | DIASTOLIC BLOOD PRESSURE: 65 MMHG | BODY MASS INDEX: 41.36 KG/M2

## 2025-05-20 DIAGNOSIS — G47.33 OSA (OBSTRUCTIVE SLEEP APNEA): Primary | ICD-10-CM

## 2025-05-20 DIAGNOSIS — Z95.5 HISTORY OF CORONARY ANGIOPLASTY WITH INSERTION OF STENT: ICD-10-CM

## 2025-05-20 DIAGNOSIS — I25.10 CORONARY ARTERY DISEASE INVOLVING NATIVE CORONARY ARTERY OF NATIVE HEART WITHOUT ANGINA PECTORIS: ICD-10-CM

## 2025-05-20 PROCEDURE — 99999 PR PBB SHADOW E&M-EST. PATIENT-LVL III: CPT | Mod: PBBFAC,,, | Performed by: NURSE PRACTITIONER

## 2025-05-20 PROCEDURE — 99214 OFFICE O/P EST MOD 30 MIN: CPT | Mod: S$GLB,,, | Performed by: NURSE PRACTITIONER

## 2025-05-20 PROCEDURE — 3078F DIAST BP <80 MM HG: CPT | Mod: CPTII,S$GLB,, | Performed by: NURSE PRACTITIONER

## 2025-05-20 PROCEDURE — 3074F SYST BP LT 130 MM HG: CPT | Mod: CPTII,S$GLB,, | Performed by: NURSE PRACTITIONER

## 2025-05-20 PROCEDURE — 1159F MED LIST DOCD IN RCRD: CPT | Mod: CPTII,S$GLB,, | Performed by: NURSE PRACTITIONER

## 2025-05-20 PROCEDURE — 3008F BODY MASS INDEX DOCD: CPT | Mod: CPTII,S$GLB,, | Performed by: NURSE PRACTITIONER

## 2025-05-20 NOTE — PROGRESS NOTES
cc: ALEENA    He has been adherent with cpap since 2019. Recently got new Airsense 11 machine with [facility]. Sleeps qhs with cpap 5-20cm. Using Simplus mask and denies breakthrough symptoms. 11# loss.     CAD LAD stenting 2024  Interrogation. Airsense 11 30davg 7.2h/n AHI 1.1, 90%tile 11.5cm    2019(273#) AHI 24.3/low sat 80%      ASSESSMENT:   ALEENA. Excellent adherence, benefits from therapy. AHI<5.  He has  medical comorbidities of CAD,  hypertension. Warrants ongoing definitive treatment    PLAN:   1. continue apap 5-20cm. [facility].    2. Discussed control of ALEENA  See cards and PCP as advised/continue meds  RTC annually, sooner if needed

## 2025-06-17 ENCOUNTER — PATIENT MESSAGE (OUTPATIENT)
Dept: FAMILY MEDICINE | Facility: CLINIC | Age: 65
End: 2025-06-17
Payer: COMMERCIAL

## 2025-06-20 ENCOUNTER — LAB VISIT (OUTPATIENT)
Dept: LAB | Facility: HOSPITAL | Age: 65
End: 2025-06-20
Attending: INTERNAL MEDICINE
Payer: COMMERCIAL

## 2025-06-20 DIAGNOSIS — Z00.00 PREVENTATIVE HEALTH CARE: ICD-10-CM

## 2025-06-20 LAB
ABSOLUTE EOSINOPHIL (OHS): 0.18 K/UL
ABSOLUTE MONOCYTE (OHS): 0.64 K/UL (ref 0.3–1)
ABSOLUTE NEUTROPHIL COUNT (OHS): 4.47 K/UL (ref 1.8–7.7)
ALBUMIN SERPL BCP-MCNC: 4.2 G/DL (ref 3.5–5.2)
ALP SERPL-CCNC: 57 UNIT/L (ref 40–150)
ALT SERPL W/O P-5'-P-CCNC: 26 UNIT/L (ref 10–44)
ANION GAP (OHS): 7 MMOL/L (ref 8–16)
AST SERPL-CCNC: 26 UNIT/L (ref 11–45)
BASOPHILS # BLD AUTO: 0.03 K/UL
BASOPHILS NFR BLD AUTO: 0.4 %
BILIRUB SERPL-MCNC: 0.7 MG/DL (ref 0.1–1)
BUN SERPL-MCNC: 19 MG/DL (ref 8–23)
CALCIUM SERPL-MCNC: 9.4 MG/DL (ref 8.7–10.5)
CHLORIDE SERPL-SCNC: 102 MMOL/L (ref 95–110)
CHOLEST SERPL-MCNC: 104 MG/DL (ref 120–199)
CHOLEST/HDLC SERPL: 2.9 {RATIO} (ref 2–5)
CO2 SERPL-SCNC: 28 MMOL/L (ref 23–29)
CREAT SERPL-MCNC: 1 MG/DL (ref 0.5–1.4)
EAG (OHS): 108 MG/DL (ref 68–131)
ERYTHROCYTE [DISTWIDTH] IN BLOOD BY AUTOMATED COUNT: 13.1 % (ref 11.5–14.5)
GFR SERPLBLD CREATININE-BSD FMLA CKD-EPI: >60 ML/MIN/1.73/M2
GLUCOSE SERPL-MCNC: 108 MG/DL (ref 70–110)
HBA1C MFR BLD: 5.4 % (ref 4–5.6)
HCT VFR BLD AUTO: 44.2 % (ref 40–54)
HDLC SERPL-MCNC: 36 MG/DL (ref 40–75)
HDLC SERPL: 34.6 % (ref 20–50)
HGB BLD-MCNC: 14.4 GM/DL (ref 14–18)
IMM GRANULOCYTES # BLD AUTO: 0.03 K/UL (ref 0–0.04)
IMM GRANULOCYTES NFR BLD AUTO: 0.4 % (ref 0–0.5)
LDLC SERPL CALC-MCNC: 46.2 MG/DL (ref 63–159)
LYMPHOCYTES # BLD AUTO: 1.42 K/UL (ref 1–4.8)
MCH RBC QN AUTO: 27.5 PG (ref 27–31)
MCHC RBC AUTO-ENTMCNC: 32.6 G/DL (ref 32–36)
MCV RBC AUTO: 85 FL (ref 82–98)
NONHDLC SERPL-MCNC: 68 MG/DL
NUCLEATED RBC (/100WBC) (OHS): 0 /100 WBC
PLATELET # BLD AUTO: 168 K/UL (ref 150–450)
PMV BLD AUTO: 11.2 FL (ref 9.2–12.9)
POTASSIUM SERPL-SCNC: 4.3 MMOL/L (ref 3.5–5.1)
PROT SERPL-MCNC: 6.8 GM/DL (ref 6–8.4)
PSA SERPL-MCNC: 3.7 NG/ML
RBC # BLD AUTO: 5.23 M/UL (ref 4.6–6.2)
RELATIVE EOSINOPHIL (OHS): 2.7 %
RELATIVE LYMPHOCYTE (OHS): 21 % (ref 18–48)
RELATIVE MONOCYTE (OHS): 9.5 % (ref 4–15)
RELATIVE NEUTROPHIL (OHS): 66 % (ref 38–73)
SODIUM SERPL-SCNC: 137 MMOL/L (ref 136–145)
TRIGL SERPL-MCNC: 109 MG/DL (ref 30–150)
TSH SERPL-ACNC: 2.8 UIU/ML (ref 0.4–4)
WBC # BLD AUTO: 6.77 K/UL (ref 3.9–12.7)

## 2025-06-20 PROCEDURE — 85025 COMPLETE CBC W/AUTO DIFF WBC: CPT

## 2025-06-20 PROCEDURE — 80061 LIPID PANEL: CPT

## 2025-06-20 PROCEDURE — 84443 ASSAY THYROID STIM HORMONE: CPT

## 2025-06-20 PROCEDURE — 36415 COLL VENOUS BLD VENIPUNCTURE: CPT | Mod: PO

## 2025-06-20 PROCEDURE — 80053 COMPREHEN METABOLIC PANEL: CPT

## 2025-06-20 PROCEDURE — 83036 HEMOGLOBIN GLYCOSYLATED A1C: CPT

## 2025-06-20 PROCEDURE — 84153 ASSAY OF PSA TOTAL: CPT

## 2025-06-25 ENCOUNTER — OFFICE VISIT (OUTPATIENT)
Dept: FAMILY MEDICINE | Facility: CLINIC | Age: 65
End: 2025-06-25
Payer: COMMERCIAL

## 2025-06-25 VITALS
WEIGHT: 295.44 LBS | OXYGEN SATURATION: 96 % | HEIGHT: 71 IN | HEART RATE: 84 BPM | SYSTOLIC BLOOD PRESSURE: 120 MMHG | BODY MASS INDEX: 41.36 KG/M2 | DIASTOLIC BLOOD PRESSURE: 70 MMHG

## 2025-06-25 DIAGNOSIS — I10 PRIMARY HYPERTENSION: ICD-10-CM

## 2025-06-25 DIAGNOSIS — E66.813 CLASS 3 SEVERE OBESITY DUE TO EXCESS CALORIES WITH SERIOUS COMORBIDITY AND BODY MASS INDEX (BMI) OF 40.0 TO 44.9 IN ADULT: ICD-10-CM

## 2025-06-25 DIAGNOSIS — Z00.00 PREVENTATIVE HEALTH CARE: Primary | ICD-10-CM

## 2025-06-25 DIAGNOSIS — C44.91 SKIN CANCER, BASAL CELL: ICD-10-CM

## 2025-06-25 DIAGNOSIS — E78.5 DYSLIPIDEMIA: ICD-10-CM

## 2025-06-25 DIAGNOSIS — I25.10 CORONARY ARTERY DISEASE INVOLVING NATIVE CORONARY ARTERY OF NATIVE HEART WITHOUT ANGINA PECTORIS: ICD-10-CM

## 2025-06-25 DIAGNOSIS — G47.33 OSA ON CPAP: ICD-10-CM

## 2025-06-25 DIAGNOSIS — E66.01 CLASS 3 SEVERE OBESITY DUE TO EXCESS CALORIES WITH SERIOUS COMORBIDITY AND BODY MASS INDEX (BMI) OF 40.0 TO 44.9 IN ADULT: ICD-10-CM

## 2025-06-25 DIAGNOSIS — F90.9 ATTENTION DEFICIT HYPERACTIVITY DISORDER (ADHD), UNSPECIFIED ADHD TYPE: ICD-10-CM

## 2025-06-25 DIAGNOSIS — Z12.11 SCREEN FOR COLON CANCER: ICD-10-CM

## 2025-06-25 PROCEDURE — 3008F BODY MASS INDEX DOCD: CPT | Mod: CPTII,S$GLB,, | Performed by: INTERNAL MEDICINE

## 2025-06-25 PROCEDURE — 1159F MED LIST DOCD IN RCRD: CPT | Mod: CPTII,S$GLB,, | Performed by: INTERNAL MEDICINE

## 2025-06-25 PROCEDURE — 3078F DIAST BP <80 MM HG: CPT | Mod: CPTII,S$GLB,, | Performed by: INTERNAL MEDICINE

## 2025-06-25 PROCEDURE — 99999 PR PBB SHADOW E&M-EST. PATIENT-LVL IV: CPT | Mod: PBBFAC,,, | Performed by: INTERNAL MEDICINE

## 2025-06-25 PROCEDURE — 3044F HG A1C LEVEL LT 7.0%: CPT | Mod: CPTII,S$GLB,, | Performed by: INTERNAL MEDICINE

## 2025-06-25 PROCEDURE — 3074F SYST BP LT 130 MM HG: CPT | Mod: CPTII,S$GLB,, | Performed by: INTERNAL MEDICINE

## 2025-06-25 PROCEDURE — 99396 PREV VISIT EST AGE 40-64: CPT | Mod: S$GLB,,, | Performed by: INTERNAL MEDICINE

## 2025-06-25 PROCEDURE — 1160F RVW MEDS BY RX/DR IN RCRD: CPT | Mod: CPTII,S$GLB,, | Performed by: INTERNAL MEDICINE

## 2025-07-02 NOTE — PROGRESS NOTES
Patient ID: Aj Marte is a 64 y.o. male    Chief Complaint: Annual Exam    History of Present Illness    CHIEF COMPLAINT:  Patient presents for an annual wellness visit.    HPI:  Patient reports no new health concerns or symptoms during this visit. He has started exercising since the beginning of the year, which has improved his blood pressure and overall health. He has lost approximately 5 to 10 lbs since last year. He continues to use his CPAP machine for obstructive sleep apnea and was recently evaluated by his sleep doctor for a new device. He is evaluated by his cardiologist, Dr. Trista Douglass at the [facility], every 6 months for follow-up related to his previous stent placement and to monitor his cholesterol levels while on Repatha. He denies any current health problems or new symptoms, and denies a family history of prostate cancer.    MEDICATIONS:  Patient is on Plavix for his stent and Repatha for cholesterol management. He takes Tamsulosin (Flomax) nightly for prostate issues and Vyvanse for ADHD, which is working okay. He is also on blood pressure medicines and uses CPAP for obstructive sleep apnea. Patient has discontinued Zetia, a cholesterol medication.    MEDICAL HISTORY:  Patient has a history of hypertension and obstructive sleep apnea, for which he uses CPAP. He also has a history of cardiac stent placement.    SURGICAL HISTORY:  Patient has undergone cardiac stent placement.    TEST RESULTS:  On June 20th, the patient underwent several labs. His complete blood count was normal, indicating he is not anemic. The complete metabolic panel, which includes kidney, liver, sugar, and electrolyte tests, was normal. His blood glucose level was also normal. The cholesterol panel showed low overall cholesterol, low LDL (bad cholesterol), and low HDL (good cholesterol). The thyroid test was normal. The PSA (Prostate-Specific Antigen) test result was 3.7, showing an upward trend  over time from previous results of 3.5, 1.72, and 1.0. Patient recently completed a sleep study, which led to the continuation of his CPAP use.          ROS: Otherwise Negative     Physical Exam    General: Well-appearing. Well-nourished. No distress.  HEENT: Conjunctivae normal. Pupils are equal and reactive to light. TMs are clear and intact bilaterally. Hearing grossly normal. Nasopharynx clear. Oropharynx clear.  Neck: Supple. No thyromegaly. No bruits.  Lymph: No cervical adenopathy. No supraclavicular adenopathy.  Heart: Regular rate and rhythm. No murmur. No rub. No gallop.  Lungs: Clear to auscultation. Respiratory effort normal.  Abdomen: Soft. Nontender. Nondistended. Normoactive bowel sounds. No hepatomegaly. No masses.  Extremities: Good distal pulses. No edema.  Psych: Oriented to time person place. Judgment unimpaired. Insight unimpaired. Mood appropriate. Affect appropriate.  Vitals: Blood pressure: 120/70.          Assessment & Plan    OBESITY, CLASS 3:  - Noted patient has lost approximately 5-10 lbs since last year and has started exercising since the beginning of the year, which has helped with both blood pressure control and weight loss.  - Discussed GLP-1 class medications for weight loss and their indication for obstructive sleep apnea.  - Advised patient to maintain current exercise regimen while considering GLP-1 medications as a potential weight management option.    HYPERTENSION:  - Blood pressure today is well-controlled at 120/70.  - Will continue current antihypertensive medications at same dosage.    OBSTRUCTIVE SLEEP APNEA:  - Patient confirmed using CPAP regularly.  - Advised to continue CPAP therapy.    CORONARY ARTERY DISEASE:  - Will continue clopidogrel (Plavix) for cardiovascular health post-stent placement.    HYPERLIPIDEMIA:  - Cholesterol numbers are stable with suppressed LDL and slightly low HDL.  - Patient's cholesterol is monitored every 6 months by cardiologist.  - Will  continue Repatha for management.    ATTENTION-DEFICIT HYPERACTIVITY DISORDER:  - Vyvanse is working adequately for ADHD management and will be continued.    BENIGN PROSTATIC HYPERPLASIA:  - PSA level is 3.5 to 3.7, showing an upward trend over time.  - If PSA exceeds 4, further investigation will be needed.  - Will continue tamsulosin (Flomax) nightly for prostate health management.    ANTIPLATELET THERAPY:  - Will continue clopidogrel (Plavix) therapy.    PREVENTIVE CARE:  - Discussed and ordered Cologuard test as an alternative to colonoscopy for colon cancer screening, which provides 3-year coverage.  - Educated patient on shingles vaccine, highlighting its 90% effectiveness and two-shot series nature.  - Advised regular sunscreen application for skin cancer prevention.            Follow up in about 4 months (around 10/25/2025).    Aj was seen today for annual exam.    Diagnoses and all orders for this visit:    Screen for colon cancer  -     Cologuard Screening (Multitarget Stool DNA); Future  -     Cologuard Screening (Multitarget Stool DNA)    ALEENA on CPAP    Primary hypertension    Attention deficit hyperactivity disorder (ADHD), unspecified ADHD type    Coronary artery disease involving native coronary artery of native heart without angina pectoris    Dyslipidemia    Class 3 severe obesity due to excess calories with serious comorbidity and body mass index (BMI) of 40.0 to 44.9 in adult    Skin cancer, basal cell     Healthcare maintenance performed, reviewed, updated and counseled today.    This note was generated with the assistance of ambient listening technology. Verbal consent was obtained by the patient and accompanying visitor(s) for the recording of patient appointment to facilitate this note. I attest to having reviewed and edited the generated note for accuracy, though some syntax or spelling errors may persist. Please contact the author of this note for any clarification.

## 2025-07-31 ENCOUNTER — TELEPHONE (OUTPATIENT)
Dept: FAMILY MEDICINE | Facility: CLINIC | Age: 65
End: 2025-07-31
Payer: COMMERCIAL

## 2025-07-31 DIAGNOSIS — R19.5 POSITIVE COLORECTAL CANCER SCREENING USING COLOGUARD TEST: Primary | ICD-10-CM

## 2025-07-31 NOTE — TELEPHONE ENCOUNTER
Please contact patient and inform him that I have ordered a colonoscopy because his Cologuard stool test came back positive and he needs further evaluation.  The endoscopy department should be contacting him soon to schedule colonoscopy.  Please let me know if he has any additional questions.

## 2025-08-01 ENCOUNTER — TELEPHONE (OUTPATIENT)
Dept: ENDOSCOPY | Facility: HOSPITAL | Age: 65
End: 2025-08-01
Payer: COMMERCIAL

## 2025-08-01 NOTE — TELEPHONE ENCOUNTER
Copied from CRM #[id number]. Topic: Appointments - Appointment Access  >> Aug 1, 2025  8:05 AM Nessa wrote:  Patient is calling to schedule an appointment. Please contact pt

## 2025-08-05 NOTE — TELEPHONE ENCOUNTER
LVM for Pt to call office back. Called to inform him that Cologuard stool came back positive. Dr Mcghee has ordered a colonoscopy. Please call back with any questions.

## 2025-09-05 DIAGNOSIS — F90.9 ATTENTION DEFICIT HYPERACTIVITY DISORDER (ADHD), UNSPECIFIED ADHD TYPE: ICD-10-CM

## 2025-09-05 RX ORDER — LISDEXAMFETAMINE DIMESYLATE 30 MG/1
30 CAPSULE ORAL EVERY MORNING
Qty: 90 CAPSULE | Refills: 0 | Status: SHIPPED | OUTPATIENT
Start: 2025-09-05